# Patient Record
Sex: MALE | Race: WHITE | NOT HISPANIC OR LATINO | Employment: PART TIME | URBAN - METROPOLITAN AREA
[De-identification: names, ages, dates, MRNs, and addresses within clinical notes are randomized per-mention and may not be internally consistent; named-entity substitution may affect disease eponyms.]

---

## 2018-07-13 ENCOUNTER — RECORDS - HEALTHEAST (OUTPATIENT)
Dept: LAB | Facility: CLINIC | Age: 58
End: 2018-07-13

## 2018-07-13 LAB
ANION GAP SERPL CALCULATED.3IONS-SCNC: 10 MMOL/L (ref 5–18)
BNP SERPL-MCNC: 33 PG/ML (ref 0–49)
BUN SERPL-MCNC: 13 MG/DL (ref 8–22)
CALCIUM SERPL-MCNC: 9.8 MG/DL (ref 8.5–10.5)
CHLORIDE BLD-SCNC: 108 MMOL/L (ref 98–107)
CO2 SERPL-SCNC: 23 MMOL/L (ref 22–31)
CREAT SERPL-MCNC: 0.93 MG/DL (ref 0.7–1.3)
GFR SERPL CREATININE-BSD FRML MDRD: >60 ML/MIN/1.73M2
GLUCOSE BLD-MCNC: 96 MG/DL (ref 70–125)
POTASSIUM BLD-SCNC: 4.2 MMOL/L (ref 3.5–5)
SODIUM SERPL-SCNC: 141 MMOL/L (ref 136–145)

## 2018-07-27 ENCOUNTER — RECORDS - HEALTHEAST (OUTPATIENT)
Dept: LAB | Facility: CLINIC | Age: 58
End: 2018-07-27

## 2018-07-27 LAB
ANION GAP SERPL CALCULATED.3IONS-SCNC: 11 MMOL/L (ref 5–18)
BUN SERPL-MCNC: 17 MG/DL (ref 8–22)
CALCIUM SERPL-MCNC: 9.3 MG/DL (ref 8.5–10.5)
CHLORIDE BLD-SCNC: 105 MMOL/L (ref 98–107)
CO2 SERPL-SCNC: 23 MMOL/L (ref 22–31)
CREAT SERPL-MCNC: 0.95 MG/DL (ref 0.7–1.3)
GFR SERPL CREATININE-BSD FRML MDRD: >60 ML/MIN/1.73M2
GLUCOSE BLD-MCNC: 102 MG/DL (ref 70–125)
POTASSIUM BLD-SCNC: 4.9 MMOL/L (ref 3.5–5)
SODIUM SERPL-SCNC: 139 MMOL/L (ref 136–145)

## 2018-08-10 ENCOUNTER — RECORDS - HEALTHEAST (OUTPATIENT)
Dept: LAB | Facility: CLINIC | Age: 58
End: 2018-08-10

## 2018-08-10 LAB
ALBUMIN SERPL-MCNC: 3.7 G/DL (ref 3.5–5)
ALP SERPL-CCNC: 91 U/L (ref 45–120)
ALT SERPL W P-5'-P-CCNC: 31 U/L (ref 0–45)
ANION GAP SERPL CALCULATED.3IONS-SCNC: 13 MMOL/L (ref 5–18)
AST SERPL W P-5'-P-CCNC: 22 U/L (ref 0–40)
BILIRUB SERPL-MCNC: 0.5 MG/DL (ref 0–1)
BUN SERPL-MCNC: 16 MG/DL (ref 8–22)
CALCIUM SERPL-MCNC: 9.4 MG/DL (ref 8.5–10.5)
CHLORIDE BLD-SCNC: 105 MMOL/L (ref 98–107)
CO2 SERPL-SCNC: 23 MMOL/L (ref 22–31)
CREAT SERPL-MCNC: 1.16 MG/DL (ref 0.7–1.3)
GFR SERPL CREATININE-BSD FRML MDRD: >60 ML/MIN/1.73M2
GLUCOSE BLD-MCNC: 99 MG/DL (ref 70–125)
POTASSIUM BLD-SCNC: 4.7 MMOL/L (ref 3.5–5)
PROT SERPL-MCNC: 7.4 G/DL (ref 6–8)
SODIUM SERPL-SCNC: 141 MMOL/L (ref 136–145)
T4 FREE SERPL-MCNC: 0.9 NG/DL (ref 0.7–1.8)
TSH SERPL DL<=0.005 MIU/L-ACNC: 6.41 UIU/ML (ref 0.3–5)

## 2020-01-23 ENCOUNTER — RECORDS - HEALTHEAST (OUTPATIENT)
Dept: LAB | Facility: CLINIC | Age: 60
End: 2020-01-23

## 2020-01-23 LAB
ANION GAP SERPL CALCULATED.3IONS-SCNC: 11 MMOL/L (ref 5–18)
BUN SERPL-MCNC: 14 MG/DL (ref 8–22)
CALCIUM SERPL-MCNC: 9.5 MG/DL (ref 8.5–10.5)
CHLORIDE BLD-SCNC: 102 MMOL/L (ref 98–107)
CHOLEST SERPL-MCNC: 178 MG/DL
CO2 SERPL-SCNC: 26 MMOL/L (ref 22–31)
CREAT SERPL-MCNC: 0.92 MG/DL (ref 0.7–1.3)
FASTING STATUS PATIENT QL REPORTED: ABNORMAL
GFR SERPL CREATININE-BSD FRML MDRD: >60 ML/MIN/1.73M2
GLUCOSE BLD-MCNC: 89 MG/DL (ref 70–125)
HDLC SERPL-MCNC: 38 MG/DL
LDLC SERPL CALC-MCNC: 120 MG/DL
POTASSIUM BLD-SCNC: 4 MMOL/L (ref 3.5–5)
SODIUM SERPL-SCNC: 139 MMOL/L (ref 136–145)
TRIGL SERPL-MCNC: 100 MG/DL

## 2020-09-03 ENCOUNTER — RECORDS - HEALTHEAST (OUTPATIENT)
Dept: LAB | Facility: CLINIC | Age: 60
End: 2020-09-03

## 2020-09-03 LAB
CHOLEST SERPL-MCNC: 121 MG/DL
FASTING STATUS PATIENT QL REPORTED: ABNORMAL
HDLC SERPL-MCNC: 28 MG/DL
LDLC SERPL CALC-MCNC: 69 MG/DL
TRIGL SERPL-MCNC: 121 MG/DL

## 2020-11-04 ENCOUNTER — RECORDS - HEALTHEAST (OUTPATIENT)
Dept: LAB | Facility: CLINIC | Age: 60
End: 2020-11-04

## 2020-11-04 LAB — TSH SERPL DL<=0.005 MIU/L-ACNC: 3.36 UIU/ML (ref 0.3–5)

## 2021-04-23 ENCOUNTER — RECORDS - HEALTHEAST (OUTPATIENT)
Dept: LAB | Facility: CLINIC | Age: 61
End: 2021-04-23

## 2021-04-23 LAB
ALBUMIN SERPL-MCNC: 3.5 G/DL (ref 3.5–5)
ALP SERPL-CCNC: 106 U/L (ref 45–120)
ALT SERPL W P-5'-P-CCNC: 23 U/L (ref 0–45)
ANION GAP SERPL CALCULATED.3IONS-SCNC: 9 MMOL/L (ref 5–18)
AST SERPL W P-5'-P-CCNC: 15 U/L (ref 0–40)
BILIRUB SERPL-MCNC: 0.5 MG/DL (ref 0–1)
BNP SERPL-MCNC: 21 PG/ML (ref 0–53)
BUN SERPL-MCNC: 16 MG/DL (ref 8–22)
CALCIUM SERPL-MCNC: 9.2 MG/DL (ref 8.5–10.5)
CHLORIDE BLD-SCNC: 104 MMOL/L (ref 98–107)
CO2 SERPL-SCNC: 24 MMOL/L (ref 22–31)
CREAT SERPL-MCNC: 0.99 MG/DL (ref 0.7–1.3)
GFR SERPL CREATININE-BSD FRML MDRD: >60 ML/MIN/1.73M2
GLUCOSE BLD-MCNC: 104 MG/DL (ref 70–125)
POTASSIUM BLD-SCNC: 4.2 MMOL/L (ref 3.5–5)
PROT SERPL-MCNC: 7.1 G/DL (ref 6–8)
SODIUM SERPL-SCNC: 137 MMOL/L (ref 136–145)

## 2021-06-04 ENCOUNTER — THERAPY VISIT (OUTPATIENT)
Dept: PHYSICAL THERAPY | Facility: CLINIC | Age: 61
End: 2021-06-04
Payer: COMMERCIAL

## 2021-06-04 DIAGNOSIS — M25.551 BILATERAL HIP PAIN: ICD-10-CM

## 2021-06-04 DIAGNOSIS — M25.552 BILATERAL HIP PAIN: ICD-10-CM

## 2021-06-04 PROCEDURE — 97161 PT EVAL LOW COMPLEX 20 MIN: CPT | Mod: GP | Performed by: PHYSICAL THERAPIST

## 2021-06-04 PROCEDURE — 97110 THERAPEUTIC EXERCISES: CPT | Mod: GP | Performed by: PHYSICAL THERAPIST

## 2021-06-04 PROCEDURE — 97530 THERAPEUTIC ACTIVITIES: CPT | Mod: GP | Performed by: PHYSICAL THERAPIST

## 2021-06-04 ASSESSMENT — ACTIVITIES OF DAILY LIVING (ADL)
WALKING_UP_STEEP_HILLS: EXTREME DIFFICULTY
HOS_ADL_COUNT: 17
GETTING_INTO_AND_OUT_OF_A_BATHTUB: SLIGHT DIFFICULTY
STANDING_FOR_15_MINUTES: MODERATE DIFFICULTY
WALKING_INITIALLY: SLIGHT DIFFICULTY
HOS_ADL_ITEM_SCORE_TOTAL: 29
GOING_UP_1_FLIGHT_OF_STAIRS: SLIGHT DIFFICULTY
WALKING_DOWN_STEEP_HILLS: EXTREME DIFFICULTY
GETTING_INTO_AND_OUT_OF_AN_AVERAGE_CAR: EXTREME DIFFICULTY
WALKING_15_MINUTES_OR_GREATER: EXTREME DIFFICULTY
HOS_ADL_SCORE(%): 42.65
SITTING_FOR_15_MINUTES: SLIGHT DIFFICULTY
ROLLING_OVER_IN_BED: EXTREME DIFFICULTY
PUTTING_ON_SOCKS_AND_SHOES: SLIGHT DIFFICULTY
LIGHT_TO_MODERATE_WORK: MODERATE DIFFICULTY
HEAVY_WORK: EXTREME DIFFICULTY
GOING_DOWN_1_FLIGHT_OF_STAIRS: SLIGHT DIFFICULTY
TWISTING/PIVOTING_ON_INVOLVED_LEG: SLIGHT DIFFICULTY
STEPPING_UP_AND_DOWN_CURBS: EXTREME DIFFICULTY
WALKING_APPROXIMATELY_10_MINUTES: EXTREME DIFFICULTY
RECREATIONAL_ACTIVITIES: EXTREME DIFFICULTY
HOS_ADL_HIGHEST_POTENTIAL_SCORE: 68
DEEP_SQUATTING: EXTREME DIFFICULTY

## 2021-06-04 NOTE — LETTER
[facility]  [address] [place] [address]  [phone]    2021    Re: Jann Harkins   :   1960  MRN:  [id number]   REFERRING PHYSICIAN:   Sabino TEIXEIRA [facility]  Date of Initial Evaluation:  21  Visits:  Rxs Used: 1  Reason for Referral:  Bilateral hip pain    EVALUATION SUMMARY    Physical Therapy Initial Evaluation  Subjective:  The history is provided by the patient. No  was used.     Therapist Generated HPI Evaluation  Problem details: Oct. 2020. Pt started having B hip (L>R) and B leg pain after slipping and fall while pushing carts outside at work in Oct. 2020. He recalls slipping on a patch of black ice at time of injury. Denies history of hip pain..         Type of problem:  Bilateral hips.  This is a chronic condition.  Condition occurred with:  A fall/slip.  Where condition occurred: at work.  Patient reports pain:  Greater trochanter and lateral.  Pain is described as burning and sharp and is intermittent.  Pain radiates to:  Thigh, knee and lower leg. Pain is worse in the P.M..  Since onset symptoms are gradually worsening.  Symptoms are exacerbated by walking, ascending stairs and other (walking up hills)  and relieved by ice and analgesics.  Special tests included:  X-ray (B hip OA).  Restrictions due to condition include:  Currently not working due to present treatment.  Barriers include:  Stairs (Lives on 3rd floor apartment building with no elevator).    Patient Health History  Jann Harkins being seen for hip and leg.   Pain is reported as 5/10 on pain scale.  General health as reported by patient is fair (d/t pain and overall health).  Pertinent medical history includes: heart problems, high blood pressure, osteoarthritis and overweight.   Red flags:  None as reported by patient.  Medical allergies: none.   Surgeries include:  None.     Current medications:  High blood pressure medication.    Current occupation is currently not working.        Re: Jann Harkins   :   1960    Objective:  Gait:    Gait Type:  Antalgic   Assistive Devices:  None  Deviations:  Shoulder:  Decr arm swing RLumbar:  Trunk lean LAnkle:  Push off decr LGeneral Deviations:  Stride length decr and base of support incr    Hip Evaluation  Hip PROM:    Flexion: Left: 50 +   Right: WFL -  Internal Rotation: Left: lacking 10 +    Right: 15 -  External Rotation: Left: 30 +    Right: WFL -  Hip Special Testing:    Left hip positive for the following special tests:  Jimmy   Right hip positive for the following special tests:  Jimmy      Assessment/Plan:    Patient is a 61 year old male with both sides hip complaints.    Patient has the following significant findings with corresponding treatment plan.                Diagnosis 1:  B hip pain with degenerative OA  Pain -  hot/cold therapy, manual therapy, self management, education and home program  Decreased ROM/flexibility - manual therapy, therapeutic exercise and home program  Decreased strength - therapeutic exercise, therapeutic activities and home program  Decreased proprioception - neuro re-education, gait training, therapeutic activities and home program  Impaired gait - gait training, assistive devices and home program  Impaired muscle performance - neuro re-education and home program  Decreased function - therapeutic activities and home program  Impaired posture - neuro re-education and home program    Therapy Evaluation Codes:   1) History comprised of:   Personal factors that impact the plan of care:      Time since onset of symptoms.    Comorbidity factors that impact the plan of care are:      Heart problems, High blood pressure, Osteoarthritis and Overweight.     Medications impacting care: High blood pressure.  2) Examination of Body Systems comprised of:   Body structures and functions that impact the plan  of care:      Hip.   Activity limitations that impact the plan of care are:      Stairs and Walking.  3) Clinical presentation characteristics are:   Stable/Uncomplicated.  4) Decision-Making    Low complexity using standardized patient assessment instrument and/or measureable assessment of functional outcome.  Cumulative Therapy Evaluation is: Low complexity.    Previous and current functional limitations:  (See Goal Flow Sheet for this information)    Short term and Long term goals: (See Goal Flow Sheet for this information)     Re: Jann Harkins   :   1960    Communication ability:  Patient appears to be able to clearly communicate and understand verbal and written communication and follow directions correctly.  Treatment Explanation - The following has been discussed with the patient:   RX ordered/plan of care  Anticipated outcomes  Possible risks and side effects  This patient would benefit from PT intervention to resume normal activities.   Rehab potential is good.    Frequency:  1 X week, once daily  Duration:  for 12 weeks  Discharge Plan:  Achieve all LTG.  Independent in home treatment program.  Reach maximal therapeutic benefit.    Thank you for your referral.    INQUIRIES  Therapist: Mundo Clark, DPT, ATC, Cert. MDT   [facility] [address] [place] [address]  Phone: [phone]  Fax: [phone]

## 2021-06-04 NOTE — PROGRESS NOTES
Physical Therapy Initial Evaluation  Subjective:  The history is provided by the patient. No  was used.   Therapist Generated HPI Evaluation  Problem details: Oct. 2020. Pt started having B hip (L>R) and B leg pain after slipping and fall while pushing carts outside at work in Oct. 2020. He recalls slipping on a patch of black ice at time of injury. Denies history of hip pain..         Type of problem:  Bilateral hips.    This is a chronic condition.  Condition occurred with:  A fall/slip.  Where condition occurred: at work.  Patient reports pain:  Greater trochanter and lateral.  Pain is described as burning and sharp and is intermittent.  Pain radiates to:  Thigh, knee and lower leg. Pain is worse in the P.M..  Since onset symptoms are gradually worsening.  Symptoms are exacerbated by walking, ascending stairs and other (walking up hills)  and relieved by ice and analgesics.  Special tests included:  X-ray (B hip OA).    Restrictions due to condition include:  Currently not working due to present treatment.  Barriers include:  Stairs (Lives on 3rd floor apartment building with no elevator).    Patient Health History  Jann MONTEIRO Torin being seen for hip and leg.          Pain is reported as 5/10 on pain scale.  General health as reported by patient is fair (d/t pain and overall health).  Pertinent medical history includes: heart problems, high blood pressure, osteoarthritis and overweight.   Red flags:  None as reported by patient.  Medical allergies: none.   Surgeries include:  None.    Current medications:  High blood pressure medication.    Current occupation is currently not working.                                       Objective:    Gait:    Gait Type:  Antalgic   Assistive Devices:  None  Deviations:  Shoulder:  Decr arm swing RLumbar:  Trunk lean LAnkle:  Push off decr LGeneral Deviations:  Stride length decr and base of support incr                                                 Hip  Evaluation  Hip PROM:    Flexion: Left: 50 +   Right: WFL -        Internal Rotation: Left: lacking 10 +    Right: 15 -  External Rotation: Left: 30 +    Right: WFL -                Hip Special Testing:    Left hip positive for the following special tests:  Jimmy   Right hip positive for the following special tests:  Jimmy                 General     ROS    Assessment/Plan:    Patient is a 61 year old male with both sides hip complaints.    Patient has the following significant findings with corresponding treatment plan.                Diagnosis 1:  B hip pain with degenerative OA  Pain -  hot/cold therapy, manual therapy, self management, education and home program  Decreased ROM/flexibility - manual therapy, therapeutic exercise and home program  Decreased strength - therapeutic exercise, therapeutic activities and home program  Decreased proprioception - neuro re-education, gait training, therapeutic activities and home program  Impaired gait - gait training, assistive devices and home program  Impaired muscle performance - neuro re-education and home program  Decreased function - therapeutic activities and home program  Impaired posture - neuro re-education and home program    Therapy Evaluation Codes:   1) History comprised of:   Personal factors that impact the plan of care:      Time since onset of symptoms.    Comorbidity factors that impact the plan of care are:      Heart problems, High blood pressure, Osteoarthritis and Overweight.     Medications impacting care: High blood pressure.  2) Examination of Body Systems comprised of:   Body structures and functions that impact the plan of care:      Hip.   Activity limitations that impact the plan of care are:      Stairs and Walking.  3) Clinical presentation characteristics are:   Stable/Uncomplicated.  4) Decision-Making    Low complexity using standardized patient assessment instrument and/or measureable assessment of functional outcome.  Cumulative  Therapy Evaluation is: Low complexity.    Previous and current functional limitations:  (See Goal Flow Sheet for this information)    Short term and Long term goals: (See Goal Flow Sheet for this information)     Communication ability:  Patient appears to be able to clearly communicate and understand verbal and written communication and follow directions correctly.  Treatment Explanation - The following has been discussed with the patient:   RX ordered/plan of care  Anticipated outcomes  Possible risks and side effects  This patient would benefit from PT intervention to resume normal activities.   Rehab potential is good.    Frequency:  1 X week, once daily  Duration:  for 12 weeks  Discharge Plan:  Achieve all LTG.  Independent in home treatment program.  Reach maximal therapeutic benefit.    Please refer to the daily flowsheet for treatment today, total treatment time and time spent performing 1:1 timed codes.

## 2021-06-08 PROBLEM — M25.552 BILATERAL HIP PAIN: Status: ACTIVE | Noted: 2021-06-08

## 2021-06-08 PROBLEM — M25.551 BILATERAL HIP PAIN: Status: ACTIVE | Noted: 2021-06-08

## 2021-06-11 ENCOUNTER — THERAPY VISIT (OUTPATIENT)
Dept: PHYSICAL THERAPY | Facility: CLINIC | Age: 61
End: 2021-06-11
Payer: COMMERCIAL

## 2021-06-11 DIAGNOSIS — M25.551 BILATERAL HIP PAIN: ICD-10-CM

## 2021-06-11 DIAGNOSIS — M25.552 BILATERAL HIP PAIN: ICD-10-CM

## 2021-06-11 PROCEDURE — 97110 THERAPEUTIC EXERCISES: CPT | Mod: GP | Performed by: PHYSICAL THERAPIST

## 2021-06-11 PROCEDURE — 97112 NEUROMUSCULAR REEDUCATION: CPT | Mod: GP | Performed by: PHYSICAL THERAPIST

## 2021-06-25 ENCOUNTER — THERAPY VISIT (OUTPATIENT)
Dept: PHYSICAL THERAPY | Facility: CLINIC | Age: 61
End: 2021-06-25
Payer: COMMERCIAL

## 2021-06-25 DIAGNOSIS — M25.551 BILATERAL HIP PAIN: ICD-10-CM

## 2021-06-25 DIAGNOSIS — M25.552 BILATERAL HIP PAIN: ICD-10-CM

## 2021-06-25 PROCEDURE — 97110 THERAPEUTIC EXERCISES: CPT | Mod: GP | Performed by: PHYSICAL THERAPIST

## 2021-06-25 PROCEDURE — 97112 NEUROMUSCULAR REEDUCATION: CPT | Mod: GP | Performed by: PHYSICAL THERAPIST

## 2021-07-09 ENCOUNTER — THERAPY VISIT (OUTPATIENT)
Dept: PHYSICAL THERAPY | Facility: CLINIC | Age: 61
End: 2021-07-09
Payer: COMMERCIAL

## 2021-07-09 DIAGNOSIS — M25.552 BILATERAL HIP PAIN: ICD-10-CM

## 2021-07-09 DIAGNOSIS — M25.551 BILATERAL HIP PAIN: ICD-10-CM

## 2021-07-09 PROCEDURE — 97110 THERAPEUTIC EXERCISES: CPT | Mod: GP | Performed by: PHYSICAL THERAPIST

## 2021-07-09 PROCEDURE — 97112 NEUROMUSCULAR REEDUCATION: CPT | Mod: GP | Performed by: PHYSICAL THERAPIST

## 2021-07-19 ENCOUNTER — THERAPY VISIT (OUTPATIENT)
Dept: PHYSICAL THERAPY | Facility: CLINIC | Age: 61
End: 2021-07-19
Payer: COMMERCIAL

## 2021-07-19 DIAGNOSIS — M25.552 BILATERAL HIP PAIN: ICD-10-CM

## 2021-07-19 DIAGNOSIS — M25.551 BILATERAL HIP PAIN: ICD-10-CM

## 2021-07-19 PROCEDURE — 97110 THERAPEUTIC EXERCISES: CPT | Mod: GP | Performed by: PHYSICAL THERAPIST

## 2021-07-19 PROCEDURE — 97112 NEUROMUSCULAR REEDUCATION: CPT | Mod: GP | Performed by: PHYSICAL THERAPIST

## 2021-08-18 ENCOUNTER — LAB REQUISITION (OUTPATIENT)
Dept: LAB | Facility: CLINIC | Age: 61
End: 2021-08-18
Payer: COMMERCIAL

## 2021-08-18 DIAGNOSIS — I89.0 LYMPHEDEMA, NOT ELSEWHERE CLASSIFIED: ICD-10-CM

## 2021-08-18 LAB
ANION GAP SERPL CALCULATED.3IONS-SCNC: 11 MMOL/L (ref 5–18)
BUN SERPL-MCNC: 15 MG/DL (ref 8–22)
CALCIUM SERPL-MCNC: 9.9 MG/DL (ref 8.5–10.5)
CHLORIDE BLD-SCNC: 106 MMOL/L (ref 98–107)
CO2 SERPL-SCNC: 22 MMOL/L (ref 22–31)
CREAT SERPL-MCNC: 0.92 MG/DL (ref 0.7–1.3)
GFR SERPL CREATININE-BSD FRML MDRD: 89 ML/MIN/1.73M2
GLUCOSE BLD-MCNC: 113 MG/DL (ref 70–125)
POTASSIUM BLD-SCNC: 4.5 MMOL/L (ref 3.5–5)
SODIUM SERPL-SCNC: 139 MMOL/L (ref 136–145)

## 2021-08-18 PROCEDURE — 80048 BASIC METABOLIC PNL TOTAL CA: CPT | Performed by: FAMILY MEDICINE

## 2021-10-08 DIAGNOSIS — Z11.59 ENCOUNTER FOR SCREENING FOR OTHER VIRAL DISEASES: ICD-10-CM

## 2021-11-04 NOTE — OR NURSING
Pt has been called multiple times, with messages being left with each attempt. Pt still has not called back. Writer reached out and spoke with Sean at [facility] that provided an additional number for Nicolle listed as his emergency contact. Her number is [phone]. A message was left for her to call us or contact Jann to contact us ASAP to get pre op info.     11/4 @ 4144 Nicolle (Irineo's girlfriend) and Irineo called on a conference call. Both parties denied knowing about the need for a pre op and covid test. They were both told that they are both hard stops and the procedure will be cx if not done. They stated understanding of arrival time, date, NPO ([facility] bowel prep regimen reviewed) and the need for a  were all addressed/reviewed with both of them and they stated complete understanding. [facility] number was given in case they cannot get in for a pre op to reschedule.

## 2021-11-05 ENCOUNTER — LAB REQUISITION (OUTPATIENT)
Dept: LAB | Facility: CLINIC | Age: 61
End: 2021-11-05
Payer: COMMERCIAL

## 2021-11-05 DIAGNOSIS — I25.119 ATHEROSCLEROTIC HEART DISEASE OF NATIVE CORONARY ARTERY WITH UNSPECIFIED ANGINA PECTORIS (H): ICD-10-CM

## 2021-11-05 LAB
ANION GAP SERPL CALCULATED.3IONS-SCNC: 12 MMOL/L (ref 5–18)
BUN SERPL-MCNC: 9 MG/DL (ref 8–22)
CALCIUM SERPL-MCNC: 9.9 MG/DL (ref 8.5–10.5)
CHLORIDE BLD-SCNC: 104 MMOL/L (ref 98–107)
CO2 SERPL-SCNC: 24 MMOL/L (ref 22–31)
CREAT SERPL-MCNC: 0.95 MG/DL (ref 0.7–1.3)
GFR SERPL CREATININE-BSD FRML MDRD: 86 ML/MIN/1.73M2
GLUCOSE BLD-MCNC: 105 MG/DL (ref 70–125)
POTASSIUM BLD-SCNC: 4.1 MMOL/L (ref 3.5–5)
SODIUM SERPL-SCNC: 140 MMOL/L (ref 136–145)

## 2021-11-05 PROCEDURE — 80048 BASIC METABOLIC PNL TOTAL CA: CPT | Mod: ORL | Performed by: PHYSICIAN ASSISTANT

## 2021-11-08 ENCOUNTER — ANESTHESIA (OUTPATIENT)
Dept: SURGERY | Facility: CLINIC | Age: 61
End: 2021-11-08
Payer: COMMERCIAL

## 2021-11-08 ENCOUNTER — ANESTHESIA EVENT (OUTPATIENT)
Dept: SURGERY | Facility: CLINIC | Age: 61
End: 2021-11-08
Payer: COMMERCIAL

## 2021-11-08 ENCOUNTER — HOSPITAL ENCOUNTER (OUTPATIENT)
Facility: CLINIC | Age: 61
Discharge: HOME | End: 2021-11-08
Attending: INTERNAL MEDICINE | Admitting: INTERNAL MEDICINE
Payer: COMMERCIAL

## 2021-11-08 VITALS
BODY MASS INDEX: 53.37 KG/M2 | HEART RATE: 73 BPM | WEIGHT: 290 LBS | HEIGHT: 62 IN | OXYGEN SATURATION: 98 % | DIASTOLIC BLOOD PRESSURE: 75 MMHG | SYSTOLIC BLOOD PRESSURE: 155 MMHG | TEMPERATURE: 97.3 F | RESPIRATION RATE: 16 BRPM

## 2021-11-08 LAB — COLONOSCOPY: NORMAL

## 2021-11-08 PROCEDURE — 88305 TISSUE EXAM BY PATHOLOGIST: CPT | Mod: TC | Performed by: INTERNAL MEDICINE

## 2021-11-08 PROCEDURE — 999N000141 HC STATISTIC PRE-PROCEDURE NURSING ASSESSMENT: Performed by: INTERNAL MEDICINE

## 2021-11-08 PROCEDURE — 360N000075 HC SURGERY LEVEL 2, PER MIN: Performed by: INTERNAL MEDICINE

## 2021-11-08 PROCEDURE — 258N000003 HC RX IP 258 OP 636: Performed by: NURSE ANESTHETIST, CERTIFIED REGISTERED

## 2021-11-08 PROCEDURE — 710N000012 HC RECOVERY PHASE 2, PER MINUTE: Performed by: INTERNAL MEDICINE

## 2021-11-08 PROCEDURE — 370N000017 HC ANESTHESIA TECHNICAL FEE, PER MIN: Performed by: INTERNAL MEDICINE

## 2021-11-08 PROCEDURE — 272N000001 HC OR GENERAL SUPPLY STERILE: Performed by: INTERNAL MEDICINE

## 2021-11-08 PROCEDURE — 250N000011 HC RX IP 250 OP 636: Performed by: NURSE ANESTHETIST, CERTIFIED REGISTERED

## 2021-11-08 PROCEDURE — 258N000003 HC RX IP 258 OP 636: Performed by: ANESTHESIOLOGY

## 2021-11-08 RX ORDER — ATORVASTATIN CALCIUM 40 MG/1
40 TABLET, FILM COATED ORAL DAILY
COMMUNITY
End: 2023-05-31

## 2021-11-08 RX ORDER — SODIUM CHLORIDE, SODIUM LACTATE, POTASSIUM CHLORIDE, CALCIUM CHLORIDE 600; 310; 30; 20 MG/100ML; MG/100ML; MG/100ML; MG/100ML
INJECTION, SOLUTION INTRAVENOUS CONTINUOUS
Status: DISCONTINUED | OUTPATIENT
Start: 2021-11-08 | End: 2021-11-08 | Stop reason: HOSPADM

## 2021-11-08 RX ORDER — HYDROMORPHONE HCL IN WATER/PF 6 MG/30 ML
0.2 PATIENT CONTROLLED ANALGESIA SYRINGE INTRAVENOUS EVERY 5 MIN PRN
Status: DISCONTINUED | OUTPATIENT
Start: 2021-11-08 | End: 2021-11-08 | Stop reason: HOSPADM

## 2021-11-08 RX ORDER — PROPOFOL 10 MG/ML
INJECTION, EMULSION INTRAVENOUS CONTINUOUS PRN
Status: DISCONTINUED | OUTPATIENT
Start: 2021-11-08 | End: 2021-11-08

## 2021-11-08 RX ORDER — SODIUM CHLORIDE, SODIUM LACTATE, POTASSIUM CHLORIDE, CALCIUM CHLORIDE 600; 310; 30; 20 MG/100ML; MG/100ML; MG/100ML; MG/100ML
INJECTION, SOLUTION INTRAVENOUS CONTINUOUS PRN
Status: DISCONTINUED | OUTPATIENT
Start: 2021-11-08 | End: 2021-11-08

## 2021-11-08 RX ORDER — DEXAMETHASONE SODIUM PHOSPHATE 4 MG/ML
INJECTION, SOLUTION INTRA-ARTICULAR; INTRALESIONAL; INTRAMUSCULAR; INTRAVENOUS; SOFT TISSUE PRN
Status: DISCONTINUED | OUTPATIENT
Start: 2021-11-08 | End: 2021-11-08

## 2021-11-08 RX ORDER — NITROGLYCERIN 0.4 MG/1
0.4 TABLET SUBLINGUAL EVERY 5 MIN PRN
COMMUNITY

## 2021-11-08 RX ORDER — MEPERIDINE HYDROCHLORIDE 25 MG/ML
12.5 INJECTION INTRAMUSCULAR; INTRAVENOUS; SUBCUTANEOUS
Status: DISCONTINUED | OUTPATIENT
Start: 2021-11-08 | End: 2021-11-08 | Stop reason: HOSPADM

## 2021-11-08 RX ORDER — FENTANYL CITRATE 50 UG/ML
25 INJECTION, SOLUTION INTRAMUSCULAR; INTRAVENOUS EVERY 5 MIN PRN
Status: DISCONTINUED | OUTPATIENT
Start: 2021-11-08 | End: 2021-11-08 | Stop reason: HOSPADM

## 2021-11-08 RX ORDER — SPIRONOLACTONE 25 MG/1
25 TABLET ORAL DAILY
COMMUNITY
End: 2023-05-31

## 2021-11-08 RX ORDER — ONDANSETRON 2 MG/ML
INJECTION INTRAMUSCULAR; INTRAVENOUS PRN
Status: DISCONTINUED | OUTPATIENT
Start: 2021-11-08 | End: 2021-11-08

## 2021-11-08 RX ORDER — ASPIRIN 81 MG/1
81 TABLET, CHEWABLE ORAL DAILY
COMMUNITY

## 2021-11-08 RX ORDER — LIDOCAINE 40 MG/G
CREAM TOPICAL
Status: DISCONTINUED | OUTPATIENT
Start: 2021-11-08 | End: 2021-11-08 | Stop reason: HOSPADM

## 2021-11-08 RX ORDER — PROPOFOL 10 MG/ML
INJECTION, EMULSION INTRAVENOUS PRN
Status: DISCONTINUED | OUTPATIENT
Start: 2021-11-08 | End: 2021-11-08

## 2021-11-08 RX ORDER — ONDANSETRON 4 MG/1
4 TABLET, ORALLY DISINTEGRATING ORAL EVERY 30 MIN PRN
Status: DISCONTINUED | OUTPATIENT
Start: 2021-11-08 | End: 2021-11-08 | Stop reason: HOSPADM

## 2021-11-08 RX ORDER — FENTANYL CITRATE 50 UG/ML
25 INJECTION, SOLUTION INTRAMUSCULAR; INTRAVENOUS
Status: DISCONTINUED | OUTPATIENT
Start: 2021-11-08 | End: 2021-11-08 | Stop reason: HOSPADM

## 2021-11-08 RX ORDER — ONDANSETRON 2 MG/ML
4 INJECTION INTRAMUSCULAR; INTRAVENOUS EVERY 30 MIN PRN
Status: DISCONTINUED | OUTPATIENT
Start: 2021-11-08 | End: 2021-11-08 | Stop reason: HOSPADM

## 2021-11-08 RX ORDER — OXYCODONE HYDROCHLORIDE 5 MG/1
5 TABLET ORAL EVERY 4 HOURS PRN
Status: DISCONTINUED | OUTPATIENT
Start: 2021-11-08 | End: 2021-11-08 | Stop reason: HOSPADM

## 2021-11-08 RX ORDER — FUROSEMIDE 20 MG
20 TABLET ORAL DAILY
COMMUNITY
End: 2023-05-31

## 2021-11-08 RX ORDER — LISINOPRIL 5 MG/1
5 TABLET ORAL DAILY
COMMUNITY

## 2021-11-08 RX ADMIN — DEXAMETHASONE SODIUM PHOSPHATE 4 MG: 4 INJECTION, SOLUTION INTRA-ARTICULAR; INTRALESIONAL; INTRAMUSCULAR; INTRAVENOUS; SOFT TISSUE at 12:03

## 2021-11-08 RX ADMIN — SODIUM CHLORIDE, POTASSIUM CHLORIDE, SODIUM LACTATE AND CALCIUM CHLORIDE: 600; 310; 30; 20 INJECTION, SOLUTION INTRAVENOUS at 10:52

## 2021-11-08 RX ADMIN — PROPOFOL 10 MG: 10 INJECTION, EMULSION INTRAVENOUS at 11:56

## 2021-11-08 RX ADMIN — ONDANSETRON 4 MG: 2 INJECTION INTRAMUSCULAR; INTRAVENOUS at 12:03

## 2021-11-08 RX ADMIN — SODIUM CHLORIDE, POTASSIUM CHLORIDE, SODIUM LACTATE AND CALCIUM CHLORIDE: 600; 310; 30; 20 INJECTION, SOLUTION INTRAVENOUS at 11:42

## 2021-11-08 RX ADMIN — PROPOFOL 200 MCG/KG/MIN: 10 INJECTION, EMULSION INTRAVENOUS at 11:55

## 2021-11-08 ASSESSMENT — MIFFLIN-ST. JEOR: SCORE: 1999.68

## 2021-11-08 NOTE — ANESTHESIA PREPROCEDURE EVALUATION
Anesthesia Pre-Procedure Evaluation    Patient: Jann Harkins   MRN: [id number] : 1960        Preoperative Diagnosis: Screening for colon cancer [Z12.11]    Procedure : Procedure(s):  COLONOSCOPY          History reviewed. No pertinent past medical history.   History reviewed. No pertinent surgical history.   No Known Allergies   Social History     Tobacco Use     Smoking status: Not on file     Smokeless tobacco: Not on file   Substance Use Topics     Alcohol use: Not on file      Wt Readings from Last 1 Encounters:   No data found for Wt        Anesthesia Evaluation            ROS/MED HX  ENT/Pulmonary:     (+) BRANDAN risk factors, snores loudly, hypertension, obese,     Neurologic:  - neg neurologic ROS     Cardiovascular: Comment: Stress test 1 week prior per patient that was negative    (+) Dyslipidemia hypertension--CAD angina-with excertion. --CHF Last EF: preserved     METS/Exercise Tolerance:     Hematologic:  - neg hematologic  ROS     Musculoskeletal:   (+) arthritis,     GI/Hepatic:       Renal/Genitourinary:       Endo:     (+) thyroid problem,     Psychiatric/Substance Use:  - neg psychiatric ROS     Infectious Disease:  - neg infectious disease ROS     Malignancy:  - neg malignancy ROS     Other:            Physical Exam    Airway        Mallampati: II   TM distance: > 3 FB      Respiratory Devices and Support         Dental     Comment: muliple missing teeth, denies loose        Cardiovascular          Rhythm and rate: regular and normal     Pulmonary   pulmonary exam normal        breath sounds clear to auscultation           OUTSIDE LABS:  CBC: No results found for: WBC, HGB, HCT, PLT  BMP:   Lab Results   Component Value Date     2021     2021    POTASSIUM 4.1 2021    POTASSIUM 4.5 2021    CHLORIDE 104 2021    CHLORIDE 106 2021    CO2 24 2021    CO2 22 2021    BUN 9 2021    BUN 15 2021    CR 0.95 2021    CR 0.92  08/18/2021     11/05/2021     08/18/2021     COAGS: No results found for: PTT, INR, FIBR  POC: No results found for: BGM, HCG, HCGS  HEPATIC: No results found for: ALBUMIN, PROTTOTAL, ALT, AST, GGT, ALKPHOS, BILITOTAL, BILIDIRECT, ABY  OTHER:   Lab Results   Component Value Date    LUPILLO 9.9 11/05/2021       Anesthesia Plan    ASA Status:  4      Anesthesia Type: MAC.              Consents    Anesthesia Plan(s) and associated risks, benefits, and realistic alternatives discussed. Questions answered and patient/representative(s) expressed understanding.     - Discussed with:  Patient         Postoperative Care            Comments:                Miriam Villanueva MD

## 2021-11-08 NOTE — ANESTHESIA POSTPROCEDURE EVALUATION
Patient: Jann Harkins    Procedure: Procedure(s):  COLONOSCOPY with cecum polypectomy with cold snare       Diagnosis:Screening for colon cancer [Z12.11]  Diagnosis Additional Information: No value filed.    Anesthesia Type:  MAC    Note:  Disposition: Outpatient   Postop Pain Control: Uneventful            Sign Out: Well controlled pain   PONV: No   Neuro/Psych: Uneventful            Sign Out: Acceptable/Baseline neuro status   Airway/Respiratory: Uneventful            Sign Out: Acceptable/Baseline resp. status   CV/Hemodynamics: Uneventful            Sign Out: Acceptable CV status; No obvious hypovolemia; No obvious fluid overload   Other NRE: NONE   DID A NON-ROUTINE EVENT OCCUR? No           Last vitals:  Vitals Value Taken Time   /75 11/08/21 1245   Temp 36.3  C (97.3  F) 11/08/21 1301   Pulse 72 11/08/21 1248   Resp 16 11/08/21 1248   SpO2 98 % 11/08/21 1248   Vitals shown include unvalidated device data.    Electronically Signed By: Miriam Villanueva MD  November 8, 2021  2:09 PM

## 2021-11-08 NOTE — ANESTHESIA CARE TRANSFER NOTE
97.1  Patient: Jann Harkins    Procedure: Procedure(s):  COLONOSCOPY with cecum polypectomy with cold snare       Diagnosis: Screening for colon cancer [Z12.11]  Diagnosis Additional Information: No value filed.    Anesthesia Type:   MAC     Note:    Oropharynx: oropharynx clear of all foreign objects  Level of Consciousness: awake  Oxygen Supplementation: face mask  Level of Supplemental Oxygen (L/min / FiO2): 10  Independent Airway: airway patency satisfactory and stable  Dentition: dentition unchanged  Vital Signs Stable: post-procedure vital signs reviewed and stable  Report to RN Given: handoff report given  Patient transferred to: Phase II    Handoff Report: Identifed the Patient, Identified the Reponsible Provider, Reviewed the pertinent medical history, Discussed the surgical course, Reviewed Intra-OP anesthesia mangement and issues during anesthesia, Set expectations for post-procedure period and Allowed opportunity for questions and acknowledgement of understanding      Vitals:  Vitals Value Taken Time   /85 11/08/21 1235   Temp 97.1    Pulse 72 11/08/21 1236   Resp 18 11/08/21 1236   SpO2 100 % 11/08/21 1236   Vitals shown include unvalidated device data.    Electronically Signed By: MARTY Valverde CRNA  November 8, 2021  12:36 PM

## 2021-11-08 NOTE — OP NOTE
COLONOSCOPY REPORT    DATE OF SERVICE: 11/8/2021    ENDOSCOPIST: Alexis Arenas MD    PREOPERATIVE DIAGNOSIS: Screening colonoscopy    POSTOPERATIVE DIAGNOSIS: Colon polyp    COLONOSCOPY WITH polypectomy    OPERATIVE MEDICATIONS:  MAC per Anesthesia    PREP QUALITY: fair    DESCRIPTION OF PROCEDURE:  The patient was informed of  the risks, benefits and alternatives, and gave informed consent.  The patient had a stable cardiopulmonary status and was judged an adequate candidate for MAC.    A colonoscope was passed without difficulty from the anus to the cecum, which was recognized by appendiceal orifice and ileocecal valve.  The scope was slowly withdrawn to allow complete examination of the lower GI tract. The scope was retroflexed in the rectum.    FINDINGS WERE AS FOLLOWS:  1 sessile cecal polyp, removed and retrieved using a cold snare.  Multiple small and medium diverticula in left colon, not inflamed.  Small external hemorrhoids, not bleeding.    ESTIMATED BLOOD LOSS:  NONE    SPECIMEN OBTAINED:   1. Cecal polyp    POST-PROCEDURE DIAGNOSIS/IMPRESSION:    1. Colon polyp  2. Colonic diverticulosis  3. External hemorrhoids  4. Screening colonoscopy    PLAN:   1. OK to discharge home  2. Restart usual diet  3. Await path  4. Surveillance colonoscopy based upon path results  5. Follow up with primary provider as planned.    Alexis Arenas MD  [facility]  [phone]

## 2021-11-09 PROCEDURE — 88305 TISSUE EXAM BY PATHOLOGIST: CPT | Mod: 26 | Performed by: PATHOLOGY

## 2021-11-17 LAB — SPECIMEN STATUS: NORMAL

## 2021-11-29 LAB
PATH REPORT.ADDENDUM SPEC: NORMAL
PATH REPORT.ADDENDUM SPEC: NORMAL
PATH REPORT.COMMENTS IMP SPEC: NORMAL
PATH REPORT.COMMENTS IMP SPEC: NORMAL
PATH REPORT.FINAL DX SPEC: NORMAL
PATH REPORT.GROSS SPEC: NORMAL
PATH REPORT.MICROSCOPIC SPEC OTHER STN: NORMAL
PATH REPORT.RELEVANT HX SPEC: NORMAL
PHOTO IMAGE: NORMAL

## 2022-01-25 PROBLEM — M25.551 BILATERAL HIP PAIN: Status: RESOLVED | Noted: 2021-06-08 | Resolved: 2022-01-25

## 2022-01-25 PROBLEM — M25.552 BILATERAL HIP PAIN: Status: RESOLVED | Noted: 2021-06-08 | Resolved: 2022-01-25

## 2022-01-26 NOTE — PROGRESS NOTES
Discharge Note    Progress reporting period is from initial evaluation date (please see noted date below) to Jul 19, 2021.  Linked Episodes   Type: Episode: Status: Noted: Resolved: Last update: Updated by:   PHYSICAL THERAPY B hips 06/04/2021 Active 6/4/2021 7/19/2021  3:34 PM Mundo Clark, PT      Comments:       Jann failed to follow up and current status is unknown.  Please see information below for last relevant information on current status.  Patient seen for 5 visits.    SUBJECTIVE  Subjective changes noted by patient:  Has been walking up to 3 blocks. C/o L hip tightness and pain.  .  Current pain level is 8/10.     Previous pain level was  5/10.   Changes in function:  Yes (See Goal flowsheet attached for changes in current functional level)  Adverse reaction to treatment or activity: None    OBJECTIVE  Changes noted in objective findings: Pt cont.s to lack L hip ext during gait. Pt instructed to schedule follow up appt w/ Dr. Healy. Pt agreed w/ plan.     ASSESSMENT/PLAN  Diagnosis: B hip pain and OA   STG/LTGs have been met or progress has been made towards goals:  Yes, please see goal flowsheet for most current information  Assessment of Progress: current status is unknown.    Last current status: Pt is progressing slower than anticipated   Self Management Plans:  HEP  I have re-evaluated this patient and find that the nature, scope, duration and intensity of the therapy is appropriate for the medical condition of the patient.  Jann continues to require the following intervention to meet STG and LTG's:  HEP.    Recommendations:  Discharge with current home program.  Patient to follow up with MD as needed.    Please refer to the daily flowsheet for treatment today, total treatment time and time spent performing 1:1 timed codes.

## 2022-07-13 ENCOUNTER — LAB REQUISITION (OUTPATIENT)
Dept: LAB | Facility: CLINIC | Age: 62
End: 2022-07-13
Payer: COMMERCIAL

## 2022-07-13 DIAGNOSIS — T46.6X5A ADVERSE EFFECT OF ANTIHYPERLIPIDEMIC AND ANTIARTERIOSCLEROTIC DRUGS, INITIAL ENCOUNTER: ICD-10-CM

## 2022-07-13 LAB
ALBUMIN SERPL BCG-MCNC: 4.4 G/DL (ref 3.5–5.2)
ALP SERPL-CCNC: 110 U/L (ref 40–129)
ALT SERPL W P-5'-P-CCNC: 48 U/L (ref 10–50)
ANION GAP SERPL CALCULATED.3IONS-SCNC: 10 MMOL/L (ref 7–15)
AST SERPL W P-5'-P-CCNC: 27 U/L (ref 10–50)
BILIRUB SERPL-MCNC: 0.4 MG/DL
BUN SERPL-MCNC: 17.4 MG/DL (ref 8–23)
CALCIUM SERPL-MCNC: 9.9 MG/DL (ref 8.8–10.2)
CHLORIDE SERPL-SCNC: 103 MMOL/L (ref 98–107)
CK SERPL-CCNC: 154 U/L (ref 39–308)
CREAT SERPL-MCNC: 1 MG/DL (ref 0.67–1.17)
DEPRECATED HCO3 PLAS-SCNC: 26 MMOL/L (ref 22–29)
GFR SERPL CREATININE-BSD FRML MDRD: 85 ML/MIN/1.73M2
GLUCOSE SERPL-MCNC: 100 MG/DL (ref 70–99)
LIPASE SERPL-CCNC: 22 U/L (ref 13–60)
POTASSIUM SERPL-SCNC: 4.6 MMOL/L (ref 3.4–5.3)
PROT SERPL-MCNC: 7.8 G/DL (ref 6.4–8.3)
SODIUM SERPL-SCNC: 139 MMOL/L (ref 136–145)

## 2022-07-13 PROCEDURE — 80053 COMPREHEN METABOLIC PANEL: CPT | Mod: ORL | Performed by: FAMILY MEDICINE

## 2022-07-13 PROCEDURE — 82550 ASSAY OF CK (CPK): CPT | Mod: ORL | Performed by: FAMILY MEDICINE

## 2022-07-13 PROCEDURE — 83690 ASSAY OF LIPASE: CPT | Mod: ORL | Performed by: FAMILY MEDICINE

## 2022-08-26 ENCOUNTER — LAB REQUISITION (OUTPATIENT)
Dept: LAB | Facility: CLINIC | Age: 62
End: 2022-08-26

## 2022-08-26 DIAGNOSIS — E03.9 HYPOTHYROIDISM, UNSPECIFIED: ICD-10-CM

## 2022-08-26 DIAGNOSIS — R60.0 LOCALIZED EDEMA: ICD-10-CM

## 2022-08-26 DIAGNOSIS — E78.5 HYPERLIPIDEMIA, UNSPECIFIED: ICD-10-CM

## 2022-08-26 LAB
ALBUMIN MFR UR ELPH: 11.2 MG/DL
ALBUMIN SERPL BCG-MCNC: 4 G/DL (ref 3.5–5.2)
ALP SERPL-CCNC: 103 U/L (ref 40–129)
ALT SERPL W P-5'-P-CCNC: 46 U/L (ref 10–50)
ANION GAP SERPL CALCULATED.3IONS-SCNC: 8 MMOL/L (ref 7–15)
AST SERPL W P-5'-P-CCNC: 29 U/L (ref 10–50)
BILIRUB SERPL-MCNC: 0.4 MG/DL
BUN SERPL-MCNC: 14.2 MG/DL (ref 8–23)
CALCIUM SERPL-MCNC: 9.4 MG/DL (ref 8.8–10.2)
CHLORIDE SERPL-SCNC: 103 MMOL/L (ref 98–107)
CHOLEST SERPL-MCNC: 178 MG/DL
CREAT SERPL-MCNC: 1.05 MG/DL (ref 0.67–1.17)
CREAT UR-MCNC: 174 MG/DL
DEPRECATED HCO3 PLAS-SCNC: 27 MMOL/L (ref 22–29)
GFR SERPL CREATININE-BSD FRML MDRD: 80 ML/MIN/1.73M2
GLUCOSE SERPL-MCNC: 101 MG/DL (ref 70–99)
HDLC SERPL-MCNC: 34 MG/DL
LDLC SERPL CALC-MCNC: 126 MG/DL
NONHDLC SERPL-MCNC: 144 MG/DL
NT-PROBNP SERPL-MCNC: 64 PG/ML (ref 0–125)
POTASSIUM SERPL-SCNC: 4.7 MMOL/L (ref 3.4–5.3)
PROT SERPL-MCNC: 7.3 G/DL (ref 6.4–8.3)
PROT/CREAT 24H UR: 0.06 MG/MG CR (ref 0–0.2)
SODIUM SERPL-SCNC: 138 MMOL/L (ref 136–145)
T4 FREE SERPL-MCNC: 0.99 NG/DL (ref 0.9–1.7)
TRIGL SERPL-MCNC: 88 MG/DL
TSH SERPL DL<=0.005 MIU/L-ACNC: 7.76 UIU/ML (ref 0.3–4.2)

## 2022-08-26 PROCEDURE — 84156 ASSAY OF PROTEIN URINE: CPT | Performed by: FAMILY MEDICINE

## 2022-08-26 PROCEDURE — 80061 LIPID PANEL: CPT | Performed by: FAMILY MEDICINE

## 2022-08-26 PROCEDURE — 84443 ASSAY THYROID STIM HORMONE: CPT | Performed by: FAMILY MEDICINE

## 2022-08-26 PROCEDURE — 84439 ASSAY OF FREE THYROXINE: CPT | Performed by: FAMILY MEDICINE

## 2022-08-26 PROCEDURE — 80053 COMPREHEN METABOLIC PANEL: CPT | Performed by: FAMILY MEDICINE

## 2022-08-26 PROCEDURE — 83880 ASSAY OF NATRIURETIC PEPTIDE: CPT | Performed by: FAMILY MEDICINE

## 2023-02-13 ENCOUNTER — LAB REQUISITION (OUTPATIENT)
Dept: LAB | Facility: CLINIC | Age: 63
End: 2023-02-13

## 2023-02-13 DIAGNOSIS — I89.0 LYMPHEDEMA, NOT ELSEWHERE CLASSIFIED: ICD-10-CM

## 2023-02-13 DIAGNOSIS — R73.09 OTHER ABNORMAL GLUCOSE: ICD-10-CM

## 2023-02-13 PROCEDURE — 84439 ASSAY OF FREE THYROXINE: CPT | Performed by: FAMILY MEDICINE

## 2023-02-13 PROCEDURE — 83880 ASSAY OF NATRIURETIC PEPTIDE: CPT | Performed by: FAMILY MEDICINE

## 2023-02-13 PROCEDURE — 84443 ASSAY THYROID STIM HORMONE: CPT | Performed by: FAMILY MEDICINE

## 2023-02-14 LAB
NT-PROBNP SERPL-MCNC: 65 PG/ML (ref 0–900)
T4 FREE SERPL-MCNC: 1.12 NG/DL (ref 0.9–1.7)
TSH SERPL DL<=0.005 MIU/L-ACNC: 5.78 UIU/ML (ref 0.3–4.2)

## 2023-02-27 ENCOUNTER — LAB REQUISITION (OUTPATIENT)
Dept: LAB | Facility: CLINIC | Age: 63
End: 2023-02-27

## 2023-02-27 DIAGNOSIS — I89.0 LYMPHEDEMA, NOT ELSEWHERE CLASSIFIED: ICD-10-CM

## 2023-02-27 PROCEDURE — 80053 COMPREHEN METABOLIC PANEL: CPT | Performed by: FAMILY MEDICINE

## 2023-02-28 LAB
ALBUMIN SERPL BCG-MCNC: 4.2 G/DL (ref 3.5–5.2)
ALP SERPL-CCNC: 95 U/L (ref 40–129)
ALT SERPL W P-5'-P-CCNC: 48 U/L (ref 10–50)
ANION GAP SERPL CALCULATED.3IONS-SCNC: 18 MMOL/L (ref 7–15)
AST SERPL W P-5'-P-CCNC: 46 U/L (ref 10–50)
BILIRUB SERPL-MCNC: 0.7 MG/DL
BUN SERPL-MCNC: 30.6 MG/DL (ref 8–23)
CALCIUM SERPL-MCNC: 10 MG/DL (ref 8.8–10.2)
CHLORIDE SERPL-SCNC: 95 MMOL/L (ref 98–107)
CREAT SERPL-MCNC: 1.24 MG/DL (ref 0.67–1.17)
DEPRECATED HCO3 PLAS-SCNC: 23 MMOL/L (ref 22–29)
GFR SERPL CREATININE-BSD FRML MDRD: 65 ML/MIN/1.73M2
GLUCOSE SERPL-MCNC: 151 MG/DL (ref 70–99)
POTASSIUM SERPL-SCNC: 4.2 MMOL/L (ref 3.4–5.3)
PROT SERPL-MCNC: 7.8 G/DL (ref 6.4–8.3)
SODIUM SERPL-SCNC: 136 MMOL/L (ref 136–145)

## 2023-05-30 ENCOUNTER — TRANSFERRED RECORDS (OUTPATIENT)
Dept: HEALTH INFORMATION MANAGEMENT | Facility: CLINIC | Age: 63
End: 2023-05-30

## 2023-05-30 ENCOUNTER — LAB REQUISITION (OUTPATIENT)
Dept: LAB | Facility: CLINIC | Age: 63
End: 2023-05-30

## 2023-05-30 ENCOUNTER — MEDICAL CORRESPONDENCE (OUTPATIENT)
Dept: HEALTH INFORMATION MANAGEMENT | Facility: CLINIC | Age: 63
End: 2023-05-30
Payer: COMMERCIAL

## 2023-05-30 DIAGNOSIS — E11.9 TYPE 2 DIABETES MELLITUS WITHOUT COMPLICATIONS (H): ICD-10-CM

## 2023-05-30 LAB — HBA1C MFR BLD: 7.1 % (ref 4.2–6.1)

## 2023-05-30 PROCEDURE — 80061 LIPID PANEL: CPT | Performed by: FAMILY MEDICINE

## 2023-05-30 PROCEDURE — 82310 ASSAY OF CALCIUM: CPT | Performed by: FAMILY MEDICINE

## 2023-05-30 NOTE — PROGRESS NOTES
Medication Therapy Management (MTM) Encounter    ASSESSMENT:                            Medication Adherence/Access: No issues identified    Type 2 Diabetes: Patient is not meeting A1c goal of < 7%. Self monitoring of blood glucose is not at goal of fasting  mg/dL per patient report. Pt would benefit from GLP-1 agonist Ozempic is not covered, will try Victoza for blood sugar lowering, weight loss, cardiorenal benefits    HFpEF/Hypertension Patient is meeting BP goal of < 140/90mmHg.  Current weight is stable and diuretic dose seems to be appropriate.     Hypothyroidism: Stable and recent dose adjustment made      PLAN:                            1. Start Victoza 0.6mg injected under the skin every morming for 1 week, then increase to 1.2mg injected every morning. Take your shot within about 30 minutes prior to having breakfast.This medication helps your insulin to stay around longer to do its job, decreases appetite, and slows how you absorb sugar into your blood.  It is generally very effective in lowering blood sugar and helping with weight loss. There are some heart and kidney protective benefits long-term. Side effects we monitor for include nausea, vomiting, bloating, constipation or diarrhea, and abdominal pain. Make sure to store the box of Victoza in the fridge. The pen you are currently using can be kept on the counter at room temp.    Follow-up:   Future Appointments 5/31/2023 - 11/27/2023      Date Visit Type Length Department Provider     6/28/2023  3:00 PM NEW - MTM 60 min ETR  [place] [facility]  EXT Sarai Art, [facility]                    SUBJECTIVE/OBJECTIVE:                          Shaan Harkins is a 63 year old male coming in for an initial visit. He was referred to me from Gordon Bryant.      Reason for visit: Discuss switch to Victoza or other. Saw Dr. Bryant yesterday, had some updated labs, lipid panel and BMP pending.    Allergies/ADRs: None  Past Medical History: Reviewed in  chart  Tobacco: He reports that he has never smoked. He has never used smokeless tobacco.  Alcohol: none      Medication Adherence/Access: no issues reported    Type 2 Diabetes:   Was prescribed Ozempic, but not covered by insurance. Patient needs to try Bydureon, Byetta, or Victoza first. We discussed options, and he would like to try Victoza.    Blood Sugar Ranges (patient reported): fasting around 130's; afternoons around 140's after eating  Symptoms of low blood sugar? none.   Symptoms of high blood sugar? polyuria    Eye exam: none on file  Diet/Exercise: Doesn't drink much water. But will drink gatorade, sprite (16oz)  Aspirin: Taking 81mg daily and denies side effects   Statin: Rosuvastatin 10mg daily  ACEi/ARB: lisinopril  Urine Albumin (5/30/2023): normal   Date A1c   5/30/2023 7.1   2/13/23 7.0     Abstracted A1c result- sent 5/30/2023  Recent Labs   Lab Test 08/26/22  1420 09/03/20  1509   CHOL 178 121   HDL 34* 28*   * 69   TRIG 88 121       HFpEF/Hypertension:   lisinopril 5mg daily  bumetanide 2mg twice daily  Potassium chloride 20meq twice daily  NTG 0.4mg PRN chest pain - used 1 tablet last week and this resolved chest pain - is aware to refill his bottle within 3 months.  Compression stockings  Patient reports no current medication side effects.     Follows with [facility]  ECHO:  Date 8/22/2020, EF 55%  Patient is not complaining of HF symptoms .    Patient is measuring daily weights  and reports stable around 329lb on home scale.   Patient does not self-monitor blood pressure.   Patient is following a low sodium diet, is avoiding EtOH.  BP Readings from Last 3 Encounters:   05/31/23 110/84   05/30/23 106/72   02/27/2023 120/70     Wt Readings from Last 2 Encounters:   05/31/23 322 lb (146.1 kg)   05/30/23 322 lb     Last Comprehensive Metabolic Panel - labs from yesterday still pending:  Lab Results   Component Value Date     05/30/2023    POTASSIUM 5.0 05/30/2023     CHLORIDE 107 05/30/2023    CO2 21 (L) 05/30/2023    ANIONGAP 12 05/30/2023     (H) 05/30/2023    BUN 16.6 05/30/2023    CR 1.14 05/30/2023    GFRESTIMATED 72 05/30/2023    LUPILLO 9.6 05/30/2023       Hypothyroidism:  Levothyroxine 75 mcg daily - dose just increased by Dr. Bryant yesterday  Patient is having the following symptoms: none.   TSH   Date Value Ref Range Status   02/13/2023 5.78 (H) 0.30 - 4.20 uIU/mL Final   11/04/2020 3.36 0.30 - 5.00 uIU/mL Final     Free T4   Date Value Ref Range Status   02/13/2023 1.12 0.90 - 1.70 ng/dL Final         Today's Vitals: /84   Wt 322 lb (146.1 kg)   BMI 58.89 kg/m    ----------------      I spent 60 minutes with this patient today. All changes were made via collaborative practice agreement with Gordon Bryant MD. A copy of the visit note was provided to the patient's provider(s).    A summary of these recommendations was given to the patient.    Sarai Art, Pharm.D., [facility]  Medication Therapy Management Pharmacist  [phone]     Medication Therapy Recommendations  Type 2 diabetes mellitus without complication, without long-term current use of insulin (H)    Current Medication: liraglutide (VICTOZA) 18 MG/3ML solution   Rationale: Untreated condition - Needs additional medication therapy - Indication   Recommendation: Start Medication   Status: Accepted per CPA

## 2023-05-31 ENCOUNTER — OFFICE VISIT (OUTPATIENT)
Dept: PHARMACY | Facility: PHYSICIAN GROUP | Age: 63
End: 2023-05-31
Payer: COMMERCIAL

## 2023-05-31 VITALS — WEIGHT: 315 LBS | DIASTOLIC BLOOD PRESSURE: 84 MMHG | BODY MASS INDEX: 58.89 KG/M2 | SYSTOLIC BLOOD PRESSURE: 110 MMHG

## 2023-05-31 DIAGNOSIS — E03.9 HYPOTHYROIDISM, UNSPECIFIED TYPE: ICD-10-CM

## 2023-05-31 DIAGNOSIS — I50.30 HEART FAILURE WITH PRESERVED EJECTION FRACTION, NYHA CLASS I (H): ICD-10-CM

## 2023-05-31 DIAGNOSIS — E11.9 TYPE 2 DIABETES MELLITUS WITHOUT COMPLICATION, WITHOUT LONG-TERM CURRENT USE OF INSULIN (H): Primary | ICD-10-CM

## 2023-05-31 DIAGNOSIS — I10 HYPERTENSION, ESSENTIAL: ICD-10-CM

## 2023-05-31 LAB
ANION GAP SERPL CALCULATED.3IONS-SCNC: 12 MMOL/L (ref 7–15)
BUN SERPL-MCNC: 16.6 MG/DL (ref 8–23)
CALCIUM SERPL-MCNC: 9.6 MG/DL (ref 8.8–10.2)
CHLORIDE SERPL-SCNC: 107 MMOL/L (ref 98–107)
CHOLEST SERPL-MCNC: 111 MG/DL
CREAT SERPL-MCNC: 1.14 MG/DL (ref 0.67–1.17)
DEPRECATED HCO3 PLAS-SCNC: 21 MMOL/L (ref 22–29)
GFR SERPL CREATININE-BSD FRML MDRD: 72 ML/MIN/1.73M2
GLUCOSE SERPL-MCNC: 102 MG/DL (ref 70–99)
HDLC SERPL-MCNC: 27 MG/DL
LDLC SERPL CALC-MCNC: 60 MG/DL
NONHDLC SERPL-MCNC: 84 MG/DL
POTASSIUM SERPL-SCNC: 5 MMOL/L (ref 3.4–5.3)
SODIUM SERPL-SCNC: 140 MMOL/L (ref 136–145)
TRIGL SERPL-MCNC: 120 MG/DL

## 2023-05-31 PROCEDURE — 99605 MTMS BY PHARM NP 15 MIN: CPT | Performed by: PHARMACIST

## 2023-05-31 PROCEDURE — 99607 MTMS BY PHARM ADDL 15 MIN: CPT | Performed by: PHARMACIST

## 2023-05-31 RX ORDER — LIRAGLUTIDE 6 MG/ML
1.8 INJECTION SUBCUTANEOUS DAILY
COMMUNITY
End: 2024-06-21

## 2023-05-31 RX ORDER — BUMETANIDE 2 MG/1
2 TABLET ORAL 2 TIMES DAILY
COMMUNITY
Start: 2023-04-06

## 2023-05-31 RX ORDER — BLOOD SUGAR DIAGNOSTIC
STRIP MISCELLANEOUS
COMMUNITY
Start: 2023-03-23

## 2023-05-31 RX ORDER — LANCETS
EACH MISCELLANEOUS
COMMUNITY
Start: 2023-03-23

## 2023-05-31 RX ORDER — POTASSIUM CHLORIDE 1500 MG/1
1 TABLET, EXTENDED RELEASE ORAL
COMMUNITY
Start: 2023-03-12

## 2023-05-31 RX ORDER — ROSUVASTATIN CALCIUM 10 MG/1
1 TABLET, COATED ORAL
COMMUNITY
Start: 2023-05-12

## 2023-05-31 RX ORDER — LEVOTHYROXINE SODIUM 75 UG/1
1 TABLET ORAL DAILY
COMMUNITY
Start: 2023-05-30 | End: 2024-06-21

## 2023-05-31 NOTE — PATIENT INSTRUCTIONS
Recommendations from today's MTM visit:                                                      1. Start Victoza 0.6mg injected under the skin every morming for 1 week, then increase to 1.2mg injected every morning. Take your shot within about 30 minutes prior to having breakfast.This medication helps your insulin to stay around longer to do its job, decreases appetite, and slows how you absorb sugar into your blood.  It is generally very effective in lowering blood sugar and helping with weight loss. There are some heart and kidney protective benefits long-term. Side effects we monitor for include nausea, vomiting, bloating, constipation or diarrhea, and abdominal pain. Make sure to store the box of Victoza in the fridge. The pen you are currently using can be kept on the counter at room temp.    Follow-up:   Future Appointments 5/31/2023 - 11/27/2023        Date Visit Type Length Department Provider     6/28/2023  3:00 PM NEW - MTM 60 min ETR  [facility]  EXT Sarai Art, [facility]                      It was great to speak with you today.  I value your experience and would be very thankful for your time with providing feedback on our clinic survey. You may receive a survey via email or text message in the next few days.     To schedule another MTM appointment, please call the clinic directly or you may call the MTM scheduling line at [phone]    My Clinical Pharmacist's contact information:                                                      Please feel free to contact me with any questions or concerns you have.      Sarai Art, Pharm.D., [facility]  Medication Therapy Management Pharmacist

## 2023-06-28 ENCOUNTER — OFFICE VISIT (OUTPATIENT)
Dept: PHARMACY | Facility: PHYSICIAN GROUP | Age: 63
End: 2023-06-28
Payer: COMMERCIAL

## 2023-06-28 VITALS
BODY MASS INDEX: 56.81 KG/M2 | HEART RATE: 60 BPM | WEIGHT: 310.6 LBS | SYSTOLIC BLOOD PRESSURE: 108 MMHG | DIASTOLIC BLOOD PRESSURE: 62 MMHG

## 2023-06-28 DIAGNOSIS — E11.9 TYPE 2 DIABETES MELLITUS WITHOUT COMPLICATION, WITHOUT LONG-TERM CURRENT USE OF INSULIN (H): Primary | ICD-10-CM

## 2023-06-28 PROCEDURE — 99606 MTMS BY PHARM EST 15 MIN: CPT | Performed by: PHARMACIST

## 2023-06-28 PROCEDURE — 99607 MTMS BY PHARM ADDL 15 MIN: CPT | Performed by: PHARMACIST

## 2023-06-28 NOTE — PROGRESS NOTES
Medication Therapy Management (MTM) Encounter    ASSESSMENT:                            Medication Adherence/Access: No issues identified    Type 2 Diabetes: Patient is not meeting A1c goal of < 7%. Self monitoring of blood glucose is not at goal of fasting  mg/dL per patient report. Pt would benefit from GLP-1 agonist - needs further teaching on Victoza injection.    HFpEF/Hypertension: Stable. Patient is meeting BP goal of < 140/90mmHg.  Current weight is stable and diuretic dose seems to be appropriate.     PLAN:                            1. Start Victoza 0.6mg injected under the skin every morming for 1 week, then increase to 1.2mg injected every morning. Take your shot within about 30 minutes prior to having breakfast -- you can take this with your levothyroxine every morning.   This medication helps your insulin to stay around longer to do its job, decreases appetite, and slows how you absorb sugar into your blood.  It is generally very effective in lowering blood sugar and helping with weight loss. There are some heart and kidney protective benefits long-term. Side effects we monitor for include nausea, vomiting, bloating, constipation or diarrhea, and abdominal pain. Make sure to store the box of Victoza in the fridge. The pen you are currently using can be kept on the counter at room temp.    Follow-up:   Aug 02, 2023  3:00 PM  [facility] New with Sarai Art, [facility] ([facility] ) [phone]             SUBJECTIVE/OBJECTIVE:                          Shaan Harkins is a 63 year old male coming in for a follow-up visit from 5/31/23. He was referred to me from Gordon Bryant.    Reason for visit: Discuss victoza    Allergies/ADRs: None  Past Medical History: Reviewed in chart  Tobacco: He reports that he has never smoked. He has never used smokeless tobacco.  Alcohol: none    Medication Adherence/Access: no issues reported - has  not started victoza, not sure how to do the injection  Requests refill for test strips and lancets    Type 2 Diabetes:   Victoza - Rx'd last visit, but hasn't started yet. Not sure how to do the injection    Blood Sugar Ranges (patient reported): 134-150's  Symptoms of low blood sugar? none.   Symptoms of high blood sugar? polyuria  Diet/Exercise: Doesn't drink much water. But will drink gatorade, sprite (16oz)  Paying closer attention to portion sizes and getting more fruit and veggies in the diet  Trying to be active, get out side  Urine Albumin (5/30/2023): normal   Date A1c   5/30/2023 7.1   2/13/23 7.0   Abstracted A1c result- sent 5/30/2023    HFpEF/Hypertension:   lisinopril 5mg daily  bumetanide 2mg twice daily  Potassium chloride 20meq twice daily  NTG 0.4mg PRN chest pain - used 1 tablet this past week and this resolved chest pain - is aware to refill his bottle within 3 months.  Compression stockings  Patient reports no current medication side effects.     Follows with [facility]  ECHO:  Date 8/22/2020, EF 55%  Patient is complaining of HF symptoms: BLAS and SOB at rest which he reports is his baseline and is not new symptom   Patient is measuring daily weights  and reports stable around 315lb on home scale.   Patient does not self-monitor blood pressure.   Patient is following a low sodium diet, is avoiding EtOH.  BP Readings from Last 2 Encounters:   06/28/23 108/62   05/31/23 110/84     Pulse Readings from Last 1 Encounters:   06/28/23 60     Wt Readings from Last 2 Encounters:   06/28/23 310 lb 9.6 oz (140.9 kg)   05/31/23 322 lb (146.1 kg)     Lab Results   Component Value Date     05/30/2023    POTASSIUM 5.0 05/30/2023    CHLORIDE 107 05/30/2023    CO2 21 (L) 05/30/2023    ANIONGAP 12 05/30/2023     (H) 05/30/2023    BUN 16.6 05/30/2023    CR 1.14 05/30/2023    GFRESTIMATED 72 05/30/2023    LUPILLO 9.6 05/30/2023       Today's Vitals: /62   Pulse 60   Wt 310 lb 9.6  oz (140.9 kg)   BMI 56.81 kg/m    ----------------      I spent 45 minutes with this patient today. All changes were made via collaborative practice agreement with Gordon Bryant MD. A copy of the visit note was provided to the patient's provider(s).    A summary of these recommendations was given to the patient.    Sarai Art, Pharm.D., [facility]  Medication Therapy Management Pharmacist  [phone]     Medication Therapy Recommendations  Type 2 diabetes mellitus without complication, without long-term current use of insulin (H)    Current Medication: liraglutide (VICTOZA) 18 MG/3ML solution   Rationale: Does not understand instructions - Adherence - Adherence   Recommendation: Provide Education   Status: Patient Agreed - Adherence/Education

## 2023-06-28 NOTE — PATIENT INSTRUCTIONS
Recommendations from today's MTM visit:                                                      1. Start Victoza 0.6mg injected under the skin every morming for 1 week, then increase to 1.2mg injected every morning. Take your shot within about 30 minutes prior to having breakfast -- you can take this with your levothyroxine every morning.   This medication helps your insulin to stay around longer to do its job, decreases appetite, and slows how you absorb sugar into your blood.  It is generally very effective in lowering blood sugar and helping with weight loss. There are some heart and kidney protective benefits long-term. Side effects we monitor for include nausea, vomiting, bloating, constipation or diarrhea, and abdominal pain. Make sure to store the box of Victoza in the fridge. The pen you are currently using can be kept on the counter at room temp.    Follow-up:   Aug 02, 2023  3:00 PM  MTM New with Sarai Art [facility] ([facility] ) [phone]               It was great to speak with you today.  I value your experience and would be very thankful for your time with providing feedback on our clinic survey. You may receive a survey via email or text message in the next few days.     To schedule another MT appointment, please call the clinic directly or you may call the MTM scheduling line at [phone]    My Clinical Pharmacist's contact information:                                                      Please feel free to contact me with any questions or concerns you have.      Sarai Art, Pharm.D., BCACP  Medication Therapy Management Pharmacist

## 2023-08-02 ENCOUNTER — OFFICE VISIT (OUTPATIENT)
Dept: PHARMACY | Facility: PHYSICIAN GROUP | Age: 63
End: 2023-08-02
Payer: COMMERCIAL

## 2023-08-02 VITALS — BODY MASS INDEX: 55.16 KG/M2 | WEIGHT: 301.6 LBS

## 2023-08-02 DIAGNOSIS — E11.9 TYPE 2 DIABETES MELLITUS WITHOUT COMPLICATION, WITHOUT LONG-TERM CURRENT USE OF INSULIN (H): Primary | ICD-10-CM

## 2023-08-02 DIAGNOSIS — I50.30 HEART FAILURE WITH PRESERVED EJECTION FRACTION, NYHA CLASS I (H): ICD-10-CM

## 2023-08-02 PROCEDURE — 99606 MTMS BY PHARM EST 15 MIN: CPT | Performed by: PHARMACIST

## 2023-08-02 PROCEDURE — 99607 MTMS BY PHARM ADDL 15 MIN: CPT | Performed by: PHARMACIST

## 2023-08-02 NOTE — PATIENT INSTRUCTIONS
Recommendations from today's MTM visit:                                                      1. Increase Victoza to 1.2mg injected under the skin every morming. Your pen at home should last 2 weeks at this dose, then I sent a refill to the pharmacy.    2. You will can request a refill of the potassium from the pharmacy. I am also send a refill for your aspirin.    3. Follow-up with [facility] on new compression stockings.    Follow-up:   Sep 15, 2023  3:00 PM  MTM New with Sarai Art [facility] ([facility] ) [phone]          It was great to speak with you today.  I value your experience and would be very thankful for your time with providing feedback on our clinic survey. You may receive a survey via email or text message in the next few days.     To schedule another MT appointment, please call the clinic directly or you may call the MTM scheduling line at [phone]    My Clinical Pharmacist's contact information:                                                      Please feel free to contact me with any questions or concerns you have.      Sarai Art, Pharm.D., BCACP  Medication Therapy Management Pharmacist

## 2023-08-02 NOTE — PROGRESS NOTES
Medication Therapy Management (MTM) Encounter    ASSESSMENT:                            Medication Adherence/Access: No issues identified    Type 2 Diabetes: Patient is not meeting A1c goal of < 7%. Self monitoring of blood glucose is at goal of fasting  mg/dL per patient report. Pt would benefit from titrating Victoza for further blood sugar lowering, weight loss effects.    HFpEF/Hypertension: Stable. He will follow-up with [facility] on new compression stockings.    PLAN:                            1. Increase Victoza to 1.2mg injected under the skin every morming. Your pen at home should last 2 weeks at this dose, then I sent a refill to the pharmacy.    2. You will can request a refill of the potassium from the pharmacy. I am also send a refill for your aspirin.    3. Follow-up with [facility] on new compression stockings.    Follow-up:   Sep 15, 2023  3:00 PM  [facility] New with Sarai Art, [facility] ([facility] ) [phone]          SUBJECTIVE/OBJECTIVE:                          Shaan Harkins is a 63 year old male coming in for a follow-up visit from 6/28/23. He was referred to me from Gordon Bryant.      Reason for visit: victoza titration  Request refill on potassium    Allergies/ADRs: None  Past Medical History: Reviewed in chart  Tobacco: He reports that he has never smoked. He has never used smokeless tobacco.  Alcohol: none    Medication Adherence/Access: no issues reported    Type 2 Diabetes:    Victoza 0.6mg injected once daily - started last visit and has not increased to 1.2mg as instructed  Patient is not experiencing side effects.  Blood sugar monitoring: (patient reported): 117-127mg/dL, improved from 134-150's. Has been as high as 173mg/dL  Current diabetes symptoms: none  Diet/Exercise: Is drinking more water since last visit and is cutting back to 1 soda per day (sprite), gatorade  Paying closer attention to  portion sizes and getting more fruit and veggies in the diet  Trying to be active, get out side      Abstracted A1c result- sent 5/30/2023    HFpEF/Hypertension:   lisinopril 5mg daily  bumetanide 2mg twice daily  Potassium chloride 20meq twice daily  NTG 0.4mg PRN chest pain   Compression stockings - not wearing this today, says his socks are fraying, so hasn't been wearing regularly. He has had these measured and filled through [facility] in the past.  Patient reports no current medication side effects.     Follows with [facility]  ECHO:  Date 8/22/2020, EF 55%  Patient is complaining of HF symptoms: BLAS and SOB at rest which he reports is his baseline and is not new symptom ; swelling in L>R leg  Patient is measuring daily weights  and reports stable around 315lb on home scale.   Patient does not self-monitor blood pressure.   Patient is following a low sodium diet, is avoiding EtOH.  BP Readings from Last 2 Encounters:   06/28/23 108/62   05/31/23 110/84     Pulse Readings from Last 1 Encounters:   06/28/23 60     Wt Readings from Last 2 Encounters:   08/02/23 301 lb 9.6 oz (136.8 kg)   06/28/23 310 lb 9.6 oz (140.9 kg)     BMP: 5/20/23  Component Value        POTASSIUM 5.0    CHLORIDE 107    CO2 21 (L)    ANIONGAP 12     (H)    BUN 16.6    CR 1.14    GFRESTIMATED 72    LUPILLO 9.6       Today's Vitals: Wt 301 lb 9.6 oz (136.8 kg)   BMI 55.16 kg/m    ----------------      I spent 30minutes with this patient today. All changes were made via collaborative practice agreement with Gordon Bryant MD. A copy of the visit note was provided to the patient's provider(s).    A summary of these recommendations was given to the patient.    Sarai Art, Lukasz.D., [facility]  Medication Therapy Management Pharmacist  [phone]     Medication Therapy Recommendations  Type 2 diabetes mellitus without complication, without long-term current use of insulin (H)    Current Medication:  liraglutide (VICTOZA) 18 MG/3ML solution   Rationale: Dose too low - Dosage too low - Effectiveness   Recommendation: Provide Education   Status: Patient Agreed - Adherence/Education

## 2023-09-15 ENCOUNTER — OFFICE VISIT (OUTPATIENT)
Dept: PHARMACY | Facility: PHYSICIAN GROUP | Age: 63
End: 2023-09-15
Payer: COMMERCIAL

## 2023-09-15 VITALS
DIASTOLIC BLOOD PRESSURE: 72 MMHG | WEIGHT: 307.8 LBS | HEART RATE: 76 BPM | SYSTOLIC BLOOD PRESSURE: 102 MMHG | BODY MASS INDEX: 56.3 KG/M2

## 2023-09-15 DIAGNOSIS — I50.30 HEART FAILURE WITH PRESERVED EJECTION FRACTION, NYHA CLASS I (H): ICD-10-CM

## 2023-09-15 DIAGNOSIS — I10 HYPERTENSION, ESSENTIAL: ICD-10-CM

## 2023-09-15 DIAGNOSIS — E11.9 TYPE 2 DIABETES MELLITUS WITHOUT COMPLICATION, WITHOUT LONG-TERM CURRENT USE OF INSULIN (H): Primary | ICD-10-CM

## 2023-09-15 PROCEDURE — 99607 MTMS BY PHARM ADDL 15 MIN: CPT | Performed by: PHARMACIST

## 2023-09-15 PROCEDURE — 99606 MTMS BY PHARM EST 15 MIN: CPT | Performed by: PHARMACIST

## 2023-09-15 NOTE — PATIENT INSTRUCTIONS
Recommendations from today's MT visit:                                                      1. Increase Victoza to 1.8mg injected under the skin every morming. You can increase as of tomorrow. I let the pharmacy know we increased the dose and the next time you  a refill, you should receive 3 pens.    2. I sent a refill for the levothyroxine -- the thyroid medication.    3. We are going check your A1c (3 month blood sugar average) next month, you DO NOT have to be fasting.    Follow-up:   Oct 18, 2023  2:45 PM in lab and 3:00 PM with Sarai [facility]LO[facility] New with Sarai Art [facility] ([facility] ) [phone]     Sarai rAt, PharmKaryD., [facility]  Medication Therapy Management Pharmacist  [phone]    It was great to speak with you today.  I value your experience and would be very thankful for your time with providing feedback on our clinic survey. You may receive a survey via email or text message in the next few days.     To schedule another [facility] appointment, please call the clinic directly or you may call the scheduling line at [phone].    My Clinical Pharmacist's contact information:                                                      Please feel free to contact me with any questions or concerns you have.      Sarai Art, PharmKaryD., [facility]  Medication Therapy Management Pharmacist  [phone]

## 2023-09-15 NOTE — PROGRESS NOTES
Medication Therapy Management (MTM) Encounter    ASSESSMENT:                            Medication Adherence/Access: No issues identified    Type 2 Diabetes:   Patient is not meeting A1c goal of < 7%. Self monitoring of blood glucose is at goal of fasting  mg/dL per patient report. Pt would benefit from titrating Victoza for further blood sugar lowering, weight loss effects. Will plan to recheck A1c next month after 3 full months on Victoza.    HFpEF/Hypertension:   Stable.     PLAN:                            1. Increase Victoza to 1.8mg injected under the skin every morming. You can increase as of tomorrow. I let the pharmacy know we increased the dose and the next time you  a refill, you should receive 3 pens.    2. I sent a refill for the levothyroxine -- the thyroid medication.    3. We are going check your A1c (3 month blood sugar average) next month, you DO NOT have to be fasting.    Follow-up:   Oct 18, 2023  2:45 PM in lab and 3:00 PM with Sarai FLOOD New with Sarai Art [facility] ([facility] ) [phone]     Sarai Art, Pharm.D., [facility]  Medication Therapy Management Pharmacist  [phone]      SUBJECTIVE/OBJECTIVE:                          Shaan Harkins is a 63 year old male coming in for a follow-up visit from 8/2/23.     Reason for visit: victoza titration  Request refill on levothyroxine    Allergies/ADRs: None  Past Medical History: Reviewed in chart  Tobacco: He reports that he has never smoked. He has never used smokeless tobacco.  Alcohol: none    Medication Adherence/Access: no issues reported    Type 2 Diabetes:    Victoza 1.2mg injected once daily - increased last visit and is not experiencing side effects. Does notice not feeling as hungry  Blood sugar monitoring: (patient reported): 116-127mg/dL fasting; highest was 165 after eating two big macs the night before  Current diabetes symptoms: one  episode of shaking and a little dizzy with blood sugar of 150  Diet/Exercise: continuing to stay hydrated and cutting back to 1/2 soda per day (sprite), gatorade  Paying closer attention to portion sizes and getting more fruit and veggies in the diet  Trying to be active, get out side      Abstracted A1c result- sent 5/30/2023    HFpEF/Hypertension:   lisinopril 5mg daily  bumetanide 2mg twice daily  Potassium chloride 20meq twice daily  NTG 0.4mg PRN chest pain   Compression stockings - not wearing today, says he hasn't picked up new stockings we discussed last visit. He has had these measured and filled through [employer] in the past.  Patient reports no current medication side effects.     Follows with [facility]  ECHO:  Date 8/22/2020, EF 55%  Patient is complaining of HF symptoms: BLAS which he reports is his baseline and is not new symptom ; swelling in L=R leg  Patient is measuring daily weights  and reports stable around 306lb on home scale. Which is down from 315lbs last visit.   Patient does not self-monitor blood pressure.   Patient is following a low sodium diet, is avoiding alcohol.  BP Readings from Last 2 Encounters:   09/15/23 102/72   06/28/23 108/62     Pulse Readings from Last 1 Encounters:   09/15/23 76     Wt Readings from Last 2 Encounters:   09/15/23 307 lb 12.8 oz (139.6 kg)   08/02/23 301 lb 9.6 oz (136.8 kg)     BMP: 5/20/23  Component Value        POTASSIUM 5.0    CHLORIDE 107    CO2 21 (L)    ANIONGAP 12     (H)    BUN 16.6    CR 1.14    GFRESTIMATED 72    LUPILLO 9.6       Today's Vitals: /72   Pulse 76   Wt 307 lb 12.8 oz (139.6 kg)   BMI 56.30 kg/m    ----------------      I spent 30 minutes with this patient today. All changes were made via collaborative practice agreement with Gordon Bryant MD. A copy of the visit note was provided to the patient's provider(s).    A summary of these recommendations was given to the patient.    Sarai Fernandez  Rubens, Pharm.D., [facility]  Medication Therapy Management Pharmacist  [phone]     Medication Therapy Recommendations  Type 2 diabetes mellitus without complication, without long-term current use of insulin (H)    Current Medication: liraglutide (VICTOZA) 18 MG/3ML solution   Rationale: Dose too low - Dosage too low - Effectiveness   Recommendation: Increase Dose   Status: Accepted per CPA

## 2023-10-18 ENCOUNTER — OFFICE VISIT (OUTPATIENT)
Dept: PHARMACY | Facility: PHYSICIAN GROUP | Age: 63
End: 2023-10-18
Payer: COMMERCIAL

## 2023-10-18 ENCOUNTER — TRANSFERRED RECORDS (OUTPATIENT)
Dept: HEALTH INFORMATION MANAGEMENT | Facility: CLINIC | Age: 63
End: 2023-10-18
Payer: COMMERCIAL

## 2023-10-18 VITALS — SYSTOLIC BLOOD PRESSURE: 104 MMHG | DIASTOLIC BLOOD PRESSURE: 70 MMHG

## 2023-10-18 DIAGNOSIS — I50.30 HEART FAILURE WITH PRESERVED EJECTION FRACTION, NYHA CLASS I (H): ICD-10-CM

## 2023-10-18 DIAGNOSIS — E11.9 TYPE 2 DIABETES MELLITUS WITHOUT COMPLICATION, WITHOUT LONG-TERM CURRENT USE OF INSULIN (H): Primary | ICD-10-CM

## 2023-10-18 LAB — HBA1C MFR BLD: 6 % (ref 4.2–6.1)

## 2023-10-18 PROCEDURE — 99207 PR NO CHARGE LOS: CPT | Performed by: PHARMACIST

## 2023-10-18 NOTE — PATIENT INSTRUCTIONS
Recommendations from today's MTM visit:                                                      1. No changes to your medicines! Keep up the great work!    2. Your A1c today is looking great -- 6.0 and we want it to be under 7.    Follow-up:   Jan 19, 2024  3:00 PM  MTM New with Sarai Art [facility] ([facility] ) [phone]         Sarai Art, Pharm.THANIA., [facility]  Medication Therapy Management Pharmacist  [phone]    It was great to speak with you today.  I value your experience and would be very thankful for your time with providing feedback on our clinic survey. You may receive a survey via email or text message in the next few days.     To schedule another MTM appointment, please call the clinic directly or you may call the scheduling line at [phone].    My Clinical Pharmacist's contact information:                                                      Please feel free to contact me with any questions or concerns you have.      Sarai Art, PharmALEXIA., [facility]  Medication Therapy Management Pharmacist  [phone]

## 2023-10-18 NOTE — PROGRESS NOTES
Medication Therapy Management (MTM) Encounter    ASSESSMENT:                            Medication Adherence/Access: No issues identified    Type 2 Diabetes:   Patient is meeting A1c goal of < 7%, improved from previous.     HFpEF/Hypertension:   Stable.     PLAN:                            1. No changes to your medicines! Keep up the great work!    2. Your A1c today is looking great -- 6.0 and we want it to be under 7.    3. I have sent in refills for rosuvastatin, lisinopril, and bumetanide per your request. I also made sure that you have adequate refills on the Victoza.    Follow-up:   Jan 19, 2024  3:00 PM  MTM New with Sarai Art, [facility] ([facility] ) [phone]         Sarai Art, Pharm.D., [facility]  Medication Therapy Management Pharmacist  [phone]      SUBJECTIVE/OBJECTIVE:                          Shaan Harkins is a 63 year old male coming in for a follow-up visit from 9/15/23.     Reason for visit: victoza titration, A1c today  Request refill on rosuvastatin - has been out x1 week, also requesting lisinopril and bumetanide    Allergies/ADRs: None  Past Medical History: Reviewed in chart  Tobacco: He reports that he has never smoked. He has never used smokeless tobacco.  Alcohol: none    Medication Adherence/Access: no issues reported  Evicted from apartment due to lack of payment and currently staying with some friends. Hoping to move into new place in November    Type 2 Diabetes:    Victoza 1.8mg injected once daily - increased last visit and is not experiencing side effects. Does notice not feeling as hungry  Blood sugar monitoring: (patient reported): 140's fasting; has been as high as 207mg/dL and as low as 116mg/dL  Current diabetes symptoms: none reported  Diet/Exercise: continuing to stay hydrated and cutting back to 1/2 soda per day (sprite), gatorade  Paying closer attention to portion sizes and getting more  fruit and veggies in the diet  Trying to be active, get out side      Abstracted A1c result- sent 10/18/23     HFpEF/Hypertension:   lisinopril 5mg daily  bumetanide 2mg twice daily  Potassium chloride 20meq twice daily  NTG 0.4mg PRN chest pain   Compression stockings - not wearing today and hasn't been wearing at any of our recent visits. He has had these measured and filled through [employer] in the past.  Patient reports no current medication side effects.     [facility]  Patient is complaining of HF symptoms: BLAS which he reports is his baseline and is not new symptom ; swelling in L=R leg  Patient is measuring daily weights and reports stable around 306lb on home scale.   Patient does not self-monitor blood pressure.   Patient is following a low sodium diet, is avoiding alcohol.  BP Readings from Last 2 Encounters:   10/18/23 104/70   09/15/23 102/72     Pulse Readings from Last 1 Encounters:   09/15/23 76     Wt Readings from Last 2 Encounters:   09/15/23 307 lb 12.8 oz (139.6 kg)   08/02/23 301 lb 9.6 oz (136.8 kg)     BMP: 5/20/23  Component Value        POTASSIUM 5.0    CHLORIDE 107    CO2 21 (L)    ANIONGAP 12     (H)    BUN 16.6    CR 1.14    GFRESTIMATED 72    LUPILLO 9.6       Today's Vitals: /70   ----------------      I spent 30 minutes with this patient today. All changes were made via collaborative practice agreement with Gordon Bryant MD. A copy of the visit note was provided to the patient's provider(s).    A summary of these recommendations was given to the patient.    Sarai Art, Pharm.D., [facility]  Medication Therapy Management Pharmacist  [phone]     Medication Therapy Recommendations  No medication therapy recommendations to display

## 2024-01-19 ENCOUNTER — LAB REQUISITION (OUTPATIENT)
Dept: LAB | Facility: CLINIC | Age: 64
End: 2024-01-19
Payer: COMMERCIAL

## 2024-01-19 ENCOUNTER — OFFICE VISIT (OUTPATIENT)
Dept: PHARMACY | Facility: PHYSICIAN GROUP | Age: 64
End: 2024-01-19
Payer: COMMERCIAL

## 2024-01-19 VITALS
BODY MASS INDEX: 52.52 KG/M2 | SYSTOLIC BLOOD PRESSURE: 119 MMHG | DIASTOLIC BLOOD PRESSURE: 70 MMHG | HEART RATE: 72 BPM | WEIGHT: 296.4 LBS | HEIGHT: 63 IN

## 2024-01-19 DIAGNOSIS — E11.21 TYPE 2 DIABETES MELLITUS WITH DIABETIC NEPHROPATHY (H): ICD-10-CM

## 2024-01-19 DIAGNOSIS — E11.9 TYPE 2 DIABETES MELLITUS WITHOUT COMPLICATION, WITHOUT LONG-TERM CURRENT USE OF INSULIN (H): Primary | ICD-10-CM

## 2024-01-19 DIAGNOSIS — E78.5 HYPERLIPIDEMIA, UNSPECIFIED HYPERLIPIDEMIA TYPE: ICD-10-CM

## 2024-01-19 DIAGNOSIS — I50.30 HEART FAILURE WITH PRESERVED EJECTION FRACTION, NYHA CLASS I (H): ICD-10-CM

## 2024-01-19 DIAGNOSIS — I10 HYPERTENSION, ESSENTIAL: ICD-10-CM

## 2024-01-19 DIAGNOSIS — E03.9 HYPOTHYROIDISM, UNSPECIFIED TYPE: ICD-10-CM

## 2024-01-19 PROCEDURE — 82570 ASSAY OF URINE CREATININE: CPT | Mod: ORL | Performed by: FAMILY MEDICINE

## 2024-01-19 PROCEDURE — 99207 PR NO CHARGE LOS: CPT | Performed by: PHARMACIST

## 2024-01-19 NOTE — PATIENT INSTRUCTIONS
Recommendations from today's MTM visit:                                                      1. No changes to your medicines! Keep up the great work!    2. We will recheck your A1c and for any protein in the urine today.    3. I have sent in refills for your test strips    Follow-up: See Dr. Bryant in May for annual follow-up, and with me 3 months after that    Sarai Art, Pharm.D., BCACP  Medication Therapy Management Pharmacist  [phone]    It was great to speak with you today.  I value your experience and would be very thankful for your time with providing feedback on our clinic survey. You may receive a survey via email or text message in the next few days.     To schedule another MTM appointment, please call the clinic directly or you may call the scheduling line at [phone].    My Clinical Pharmacist's contact information:                                                      Please feel free to contact me with any questions or concerns you have.      Sarai Art, Pharm.THANIA., BCACP  Medication Therapy Management Pharmacist  [phone]

## 2024-01-19 NOTE — PROGRESS NOTES
"Medication Therapy Management (MTM) Encounter    ASSESSMENT:                            Medication Adherence/Access: No issues identified    Type 2 Diabetes:   Patient is meeting A1c goal of < 7%, labs pending time of visit.    HFpEF/Hypertension:   Stable.     Hyperlipidemia:   Stable.    Hypothyroidism:   Stable.      PLAN:                            1. No changes to your medicines! Keep up the great work!    2. We will recheck your A1c and for any protein in the urine today.    3. I have sent in refills for your test strips    Follow-up: See Dr. Bryant in May for annual follow-up, and with me 3 months after that    Sarai Art, Pharm.D., BCACP  Medication Therapy Management Pharmacist  [phone]      SUBJECTIVE/OBJECTIVE:                          Shaan Harkins is a 63 year old male coming in for a follow-up visit from 10/18/23.     Reason for visit: Med Review  Needs test strips refilled    Allergies/ADRs: None  Past Medical History: Reviewed in chart  Tobacco: He reports that he has never smoked. He has never used smokeless tobacco.  Alcohol: none    Medication Adherence/Access: no issues reported  Evicted from apartment in the fall due to lack of payment and currently staying with some friends. Is waiting for some 401K money    Type 2 Diabetes:    Victoza 1.8mg injected once daily - not experiencing side effects. Does notice not feeling as hungry  Blood sugar monitoring: (patient reported): 120's  Current diabetes symptoms: none reported  Diet/Exercise: continuing to stay hydrated and cutting back to 1/2 soda per day (sprite), gatorade  Paying closer attention to portion sizes and getting more fruit and veggies in the diet  Trying to be active, mall walking and walking around [employer]      Abstracted A1c result- sent 10/18/23   Urine Albumin: No results found for: \"UMALCR\"       HFpEF/Hypertension:   lisinopril 5mg daily  bumetanide 2mg twice daily  Potassium chloride 20meq twice daily  NTG 0.4mg PRN " [facility]   Cardiac Evaluation      Patient: Zenon Steel  YOB: 1953  Date: 11/2/23     Chief Complaint   Patient presents with    Hypertension    Hyperlipidemia          Referring provider: Chinmay Nguyen DO    History of Present Illness:   Ms. Tasha Miller is seen today for follow up of her hypertension and cardiomyopathy. An angiogram performed in 2006 revealed normal coronaries and EF 25%. Most recent ECHO 8/20/21 showed EF 50-55%    Today, Ms. Barroso states she has been fine. She checks her b/p regularly, it is running in 140's. Leno Guzman denies chest pain, palpitations, STOUT, dizziness, or edema. Past Medical History:   has a past medical history of Cardiomyopathy ([address]) and Hyperlipidemia. Current Outpatient Medications:     lisinopril (PRINIVIL;ZESTRIL) 40 MG tablet, Take 1 tablet by mouth twice daily, Disp: 180 tablet, Rfl: 3    carvedilol (COREG) 25 MG tablet, Take 1 tablet by mouth twice daily, Disp: 180 tablet, Rfl: 3    atorvastatin (LIPITOR) 20 MG tablet, Take 1 tablet by mouth once daily, Disp: 90 tablet, Rfl: 3    cloNIDine (CATAPRES) 0.1 MG tablet, Take 1 tablet by mouth twice daily, Disp: 180 tablet, Rfl: 3    alendronate (FOSAMAX) 70 MG tablet, Take 1 tablet by mouth once a week, Disp: , Rfl:     mometasone (NASONEX) 50 MCG/ACT nasal spray, 2 sprays by Nasal route daily, Disp: 1 Inhaler, Rfl: 1     Surgical History:   has a past surgical history that includes Tonsillectomy. Social History:  History     Social History    Marital Status: Single     Spouse Name: N/A     Number of Children: N/A    Years of Education: N/A     Occupational History    Retired from the [employer] History Main Topics    Smoking status: Never Smoker     Smokeless tobacco: Never Used    Alcohol Use: No    Drug Use: No    Sexually Active:        Family History:  family history includes Alzheimer's Disease in her mother; Asthma in her father.       Allergies:  Amlodipine     Review "chest pain   Compression stockings - not wearing today and hasn't been wearing at any of our recent visits. He has had these measured and filled through [employer] in the past.  Patient reports no current medication side effects.     Follows with [facility]  Patient is complaining of HF symptoms: BLAS which he reports is his baseline and is not new symptom ; swelling in L>R leg  Patient is measuring daily weights and reports stable around 306lb on home scale.   Patient does not self-monitor blood pressure.   Patient is following a low sodium diet, is avoiding alcohol.  BP Readings from Last 2 Encounters:   01/19/24 119/70   10/18/23 104/70     Pulse Readings from Last 1 Encounters:   01/19/24 72     Wt Readings from Last 2 Encounters:   01/19/24 296 lb 6.4 oz (134.4 kg)   09/15/23 307 lb 12.8 oz (139.6 kg)     Last Comprehensive Metabolic Panel:  Lab Results   Component Value Date     05/30/2023    POTASSIUM 5.0 05/30/2023    CHLORIDE 107 05/30/2023    CO2 21 (L) 05/30/2023    ANIONGAP 12 05/30/2023     (H) 05/30/2023    BUN 16.6 05/30/2023    CR 1.14 05/30/2023    GFRESTIMATED 72 05/30/2023    LUPILLO 9.6 05/30/2023       Hyperlipidemia:   Rosuvastatin 10mg daily  Patient reports no current medication side effects.  Lab Test 05/30/23 08/26/22   CHOL 111 178   HDL 27* 34*   LDL 60 126*   TRIG 120 88     Hypothyroidism:   Levothyroxine 75 mcg daily  Patient is having the following symptoms: none.   TSH   Date Value Ref Range Status   02/13/2023 5.78 (H) 0.30 - 4.20 uIU/mL Final   11/04/2020 3.36 0.30 - 5.00 uIU/mL Final     Free T4   Date Value Ref Range Status   02/13/2023 1.12 0.90 - 1.70 ng/dL Final         Today's Vitals: /70   Pulse 72   Ht 5' 3\" (1.6 m)   Wt 296 lb 6.4 oz (134.4 kg)   BMI 52.50 kg/m    ----------------      I spent 30 minutes with this patient today. All changes were made via collaborative practice agreement with Gordon Bryant MD. A copy of the visit note " was provided to the patient's provider(s).    A summary of these recommendations was given to the patient.    Sarai Art Pharm.D., [facility]  Medication Therapy Management Pharmacist  [phone]     Medication Therapy Recommendations  No medication therapy recommendations to display

## 2024-01-20 LAB
CREAT UR-MCNC: 171 MG/DL
MICROALBUMIN UR-MCNC: 32 MG/L
MICROALBUMIN/CREAT UR: 18.71 MG/G CR (ref 0–17)

## 2024-05-07 ENCOUNTER — LAB REQUISITION (OUTPATIENT)
Dept: LAB | Facility: CLINIC | Age: 64
End: 2024-05-07
Payer: COMMERCIAL

## 2024-05-07 ENCOUNTER — TRANSFERRED RECORDS (OUTPATIENT)
Dept: HEALTH INFORMATION MANAGEMENT | Facility: CLINIC | Age: 64
End: 2024-05-07

## 2024-05-07 DIAGNOSIS — I11.0 HYPERTENSIVE HEART DISEASE WITH HEART FAILURE (H): ICD-10-CM

## 2024-05-07 DIAGNOSIS — I89.0 LYMPHEDEMA, NOT ELSEWHERE CLASSIFIED: ICD-10-CM

## 2024-05-07 LAB — HBA1C MFR BLD: 7.3 % (ref 4.2–6.1)

## 2024-05-07 PROCEDURE — 84439 ASSAY OF FREE THYROXINE: CPT | Mod: ORL | Performed by: FAMILY MEDICINE

## 2024-05-07 PROCEDURE — 85027 COMPLETE CBC AUTOMATED: CPT | Mod: ORL | Performed by: FAMILY MEDICINE

## 2024-05-07 PROCEDURE — 84443 ASSAY THYROID STIM HORMONE: CPT | Mod: ORL | Performed by: FAMILY MEDICINE

## 2024-05-07 PROCEDURE — 84156 ASSAY OF PROTEIN URINE: CPT | Mod: ORL | Performed by: FAMILY MEDICINE

## 2024-05-07 PROCEDURE — 83880 ASSAY OF NATRIURETIC PEPTIDE: CPT | Mod: ORL | Performed by: FAMILY MEDICINE

## 2024-05-07 PROCEDURE — 80053 COMPREHEN METABOLIC PANEL: CPT | Mod: ORL | Performed by: FAMILY MEDICINE

## 2024-05-08 LAB
ERYTHROCYTE [DISTWIDTH] IN BLOOD BY AUTOMATED COUNT: 13.9 % (ref 10–15)
HCT VFR BLD AUTO: 38.7 % (ref 40–53)
HGB BLD-MCNC: 12.5 G/DL (ref 13.3–17.7)
MCH RBC QN AUTO: 27.7 PG (ref 26.5–33)
MCHC RBC AUTO-ENTMCNC: 32.3 G/DL (ref 31.5–36.5)
MCV RBC AUTO: 86 FL (ref 78–100)
PLATELET # BLD AUTO: 305 10E3/UL (ref 150–450)
RBC # BLD AUTO: 4.51 10E6/UL (ref 4.4–5.9)
WBC # BLD AUTO: 9.4 10E3/UL (ref 4–11)

## 2024-05-09 LAB
ALBUMIN MFR UR ELPH: <6 MG/DL
ALBUMIN SERPL BCG-MCNC: 3.9 G/DL (ref 3.5–5.2)
ALP SERPL-CCNC: 101 U/L (ref 40–150)
ALT SERPL W P-5'-P-CCNC: 37 U/L (ref 0–70)
ANION GAP SERPL CALCULATED.3IONS-SCNC: 16 MMOL/L (ref 7–15)
AST SERPL W P-5'-P-CCNC: 27 U/L (ref 0–45)
BILIRUB SERPL-MCNC: 0.5 MG/DL
BUN SERPL-MCNC: 31.4 MG/DL (ref 8–23)
CALCIUM SERPL-MCNC: 9 MG/DL (ref 8.8–10.2)
CHLORIDE SERPL-SCNC: 97 MMOL/L (ref 98–107)
CREAT SERPL-MCNC: 1.42 MG/DL (ref 0.67–1.17)
CREAT UR-MCNC: 22.5 MG/DL
DEPRECATED HCO3 PLAS-SCNC: 23 MMOL/L (ref 22–29)
EGFRCR SERPLBLD CKD-EPI 2021: 55 ML/MIN/1.73M2
GLUCOSE SERPL-MCNC: 132 MG/DL (ref 70–99)
NT-PROBNP SERPL-MCNC: 59 PG/ML (ref 0–900)
POTASSIUM SERPL-SCNC: 4 MMOL/L (ref 3.4–5.3)
PROT SERPL-MCNC: 7.8 G/DL (ref 6.4–8.3)
PROT/CREAT 24H UR: NORMAL MG/G{CREAT}
SODIUM SERPL-SCNC: 136 MMOL/L (ref 135–145)
T4 FREE SERPL-MCNC: 1.29 NG/DL (ref 0.9–1.7)
TSH SERPL DL<=0.005 MIU/L-ACNC: 6.05 UIU/ML (ref 0.3–4.2)

## 2024-06-10 ENCOUNTER — LAB REQUISITION (OUTPATIENT)
Dept: LAB | Facility: CLINIC | Age: 64
End: 2024-06-10
Payer: COMMERCIAL

## 2024-06-10 DIAGNOSIS — D64.9 ANEMIA, UNSPECIFIED: ICD-10-CM

## 2024-06-10 DIAGNOSIS — E03.9 HYPOTHYROIDISM, UNSPECIFIED: ICD-10-CM

## 2024-06-10 DIAGNOSIS — E11.21 TYPE 2 DIABETES MELLITUS WITH DIABETIC NEPHROPATHY (H): ICD-10-CM

## 2024-06-10 LAB
ANION GAP SERPL CALCULATED.3IONS-SCNC: 11 MMOL/L (ref 7–15)
BASOPHILS # BLD AUTO: 0.1 10E3/UL (ref 0–0.2)
BASOPHILS NFR BLD AUTO: 1 %
BUN SERPL-MCNC: 29.7 MG/DL (ref 8–23)
CALCIUM SERPL-MCNC: 9.5 MG/DL (ref 8.8–10.2)
CHLORIDE SERPL-SCNC: 98 MMOL/L (ref 98–107)
CHOLEST SERPL-MCNC: 115 MG/DL
CREAT SERPL-MCNC: 1.48 MG/DL (ref 0.67–1.17)
CREAT UR-MCNC: 65.5 MG/DL
DEPRECATED HCO3 PLAS-SCNC: 27 MMOL/L (ref 22–29)
EGFRCR SERPLBLD CKD-EPI 2021: 53 ML/MIN/1.73M2
EOSINOPHIL # BLD AUTO: 0.4 10E3/UL (ref 0–0.7)
EOSINOPHIL NFR BLD AUTO: 5 %
ERYTHROCYTE [DISTWIDTH] IN BLOOD BY AUTOMATED COUNT: 14.1 % (ref 10–15)
FASTING STATUS PATIENT QL REPORTED: ABNORMAL
FASTING STATUS PATIENT QL REPORTED: ABNORMAL
GLUCOSE SERPL-MCNC: 114 MG/DL (ref 70–99)
HCT VFR BLD AUTO: 38.5 % (ref 40–53)
HDLC SERPL-MCNC: 30 MG/DL
HGB BLD-MCNC: 12 G/DL (ref 13.3–17.7)
IMM GRANULOCYTES # BLD: 0 10E3/UL
IMM GRANULOCYTES NFR BLD: 1 %
IRON BINDING CAPACITY (ROCHE): 266 UG/DL (ref 240–430)
IRON SATN MFR SERPL: 25 % (ref 15–46)
IRON SERPL-MCNC: 67 UG/DL (ref 61–157)
LDLC SERPL CALC-MCNC: 62 MG/DL
LYMPHOCYTES # BLD AUTO: 1.5 10E3/UL (ref 0.8–5.3)
LYMPHOCYTES NFR BLD AUTO: 19 %
MCH RBC QN AUTO: 27.7 PG (ref 26.5–33)
MCHC RBC AUTO-ENTMCNC: 31.2 G/DL (ref 31.5–36.5)
MCV RBC AUTO: 89 FL (ref 78–100)
MICROALBUMIN UR-MCNC: <12 MG/L
MICROALBUMIN/CREAT UR: NORMAL MG/G{CREAT}
MONOCYTES # BLD AUTO: 1 10E3/UL (ref 0–1.3)
MONOCYTES NFR BLD AUTO: 12 %
NEUTROPHILS # BLD AUTO: 5.1 10E3/UL (ref 1.6–8.3)
NEUTROPHILS NFR BLD AUTO: 62 %
NONHDLC SERPL-MCNC: 85 MG/DL
NRBC # BLD AUTO: 0 10E3/UL
NRBC BLD AUTO-RTO: 0 /100
PLATELET # BLD AUTO: 336 10E3/UL (ref 150–450)
POTASSIUM SERPL-SCNC: 4.2 MMOL/L (ref 3.4–5.3)
RBC # BLD AUTO: 4.33 10E6/UL (ref 4.4–5.9)
RETICS # AUTO: 0.08 10E6/UL (ref 0.03–0.1)
RETICS/RBC NFR AUTO: 1.8 % (ref 0.5–2)
SODIUM SERPL-SCNC: 136 MMOL/L (ref 135–145)
T4 FREE SERPL-MCNC: 1.24 NG/DL (ref 0.9–1.7)
TRIGL SERPL-MCNC: 116 MG/DL
TSH SERPL DL<=0.005 MIU/L-ACNC: 7.15 UIU/ML (ref 0.3–4.2)
VIT B12 SERPL-MCNC: 453 PG/ML (ref 232–1245)
WBC # BLD AUTO: 8.1 10E3/UL (ref 4–11)

## 2024-06-10 PROCEDURE — 83550 IRON BINDING TEST: CPT | Mod: ORL | Performed by: FAMILY MEDICINE

## 2024-06-10 PROCEDURE — 80048 BASIC METABOLIC PNL TOTAL CA: CPT | Mod: ORL | Performed by: FAMILY MEDICINE

## 2024-06-10 PROCEDURE — 84443 ASSAY THYROID STIM HORMONE: CPT | Mod: ORL | Performed by: FAMILY MEDICINE

## 2024-06-10 PROCEDURE — 82570 ASSAY OF URINE CREATININE: CPT | Mod: ORL | Performed by: FAMILY MEDICINE

## 2024-06-10 PROCEDURE — 84439 ASSAY OF FREE THYROXINE: CPT | Mod: ORL | Performed by: FAMILY MEDICINE

## 2024-06-10 PROCEDURE — 85025 COMPLETE CBC W/AUTO DIFF WBC: CPT | Mod: ORL | Performed by: FAMILY MEDICINE

## 2024-06-10 PROCEDURE — 82746 ASSAY OF FOLIC ACID SERUM: CPT | Mod: ORL | Performed by: FAMILY MEDICINE

## 2024-06-10 PROCEDURE — 80061 LIPID PANEL: CPT | Mod: ORL | Performed by: FAMILY MEDICINE

## 2024-06-10 PROCEDURE — 82607 VITAMIN B-12: CPT | Mod: ORL | Performed by: FAMILY MEDICINE

## 2024-06-10 PROCEDURE — 85045 AUTOMATED RETICULOCYTE COUNT: CPT | Mod: ORL | Performed by: FAMILY MEDICINE

## 2024-06-11 LAB — FOLATE SERPL-MCNC: 5.8 NG/ML (ref 4.6–34.8)

## 2024-06-13 LAB
PATH REPORT.COMMENTS IMP SPEC: NORMAL
PATH REPORT.FINAL DX SPEC: NORMAL
PATH REPORT.MICROSCOPIC SPEC OTHER STN: NORMAL

## 2024-06-13 PROCEDURE — 99207 BLOOD MORPHOLOGY PATHOLOGIST REVIEW: CPT | Performed by: PATHOLOGY

## 2024-06-21 ENCOUNTER — OFFICE VISIT (OUTPATIENT)
Dept: PHARMACY | Facility: PHYSICIAN GROUP | Age: 64
End: 2024-06-21
Payer: COMMERCIAL

## 2024-06-21 VITALS
SYSTOLIC BLOOD PRESSURE: 100 MMHG | BODY MASS INDEX: 55.78 KG/M2 | HEIGHT: 63 IN | DIASTOLIC BLOOD PRESSURE: 60 MMHG | WEIGHT: 314.8 LBS | HEART RATE: 68 BPM

## 2024-06-21 DIAGNOSIS — I10 HYPERTENSION, ESSENTIAL: ICD-10-CM

## 2024-06-21 DIAGNOSIS — I50.30 HEART FAILURE WITH PRESERVED EJECTION FRACTION, NYHA CLASS I (H): ICD-10-CM

## 2024-06-21 DIAGNOSIS — E78.5 HYPERLIPIDEMIA, UNSPECIFIED HYPERLIPIDEMIA TYPE: ICD-10-CM

## 2024-06-21 DIAGNOSIS — E03.9 HYPOTHYROIDISM, UNSPECIFIED TYPE: ICD-10-CM

## 2024-06-21 DIAGNOSIS — E11.9 TYPE 2 DIABETES MELLITUS WITHOUT COMPLICATION, WITHOUT LONG-TERM CURRENT USE OF INSULIN (H): Primary | ICD-10-CM

## 2024-06-21 PROCEDURE — 99207 PR NO CHARGE LOS: CPT | Performed by: PHARMACIST

## 2024-06-21 RX ORDER — SEMAGLUTIDE 0.68 MG/ML
0.5 INJECTION, SOLUTION SUBCUTANEOUS
COMMUNITY
Start: 2024-06-06

## 2024-06-21 RX ORDER — LEVOTHYROXINE SODIUM 100 UG/1
100 TABLET ORAL DAILY
COMMUNITY
Start: 2024-06-14

## 2024-06-21 NOTE — PROGRESS NOTES
Medication Therapy Management (MTM) Encounter    ASSESSMENT:                            Medication Adherence/Access: No issues identified    Type 2 Diabetes:   Patient is not meeting A1c goal of < 7%  Titrate Ozempic for further blood sugar lowering, weight loss, and cardiorenal benefits    HFpEF/Hypertension:   Stable. Due for follow-up with cardiology.    Hyperlipidemia:   Stable.    Hypothyroidism:   Will be due for recheck of labs with recent change to levothyroxine dose.      PLAN:                            1. Increase Ozempic to 0.5mg injected once weekly. You should have about 3 doses left in your current pen. When this is gone then increase to 1mg pen and continue injecting once weekly    2. Schedule a follow-up visit with your heart doctor and consider scheduling a phone visit with Dr. Bryant to review your recent lab results.    Follow-up: Return in 1 month (on 7/24/2024) for Medication dose check, at 2:30pm.        SUBJECTIVE/OBJECTIVE:                          Shaan Harkins is a 63 year old male seen for a follow-up visit.     Reason for visit: Med Review    Allergies/ADRs: None  Past Medical History: Reviewed in chart  Tobacco: He reports that he has never smoked. He has never used smokeless tobacco.  Alcohol: none    Medication Adherence/Access: no issues reported    Type 2 Diabetes:    Ozempic 0.25mg injected once weekly - changed after last A1c and Victoza no longer covered by insurance. He did have a few episodes of vomiting, which has resolved since moving injection to thigh from stomach.    Current diabetes symptoms: none reported  Blood sugar monitoring: (patient reported): 120-150's, no lows reported    Medication History:  Victoza 1.8mg injected once daily - not experiencing side effects. Does notice not feeling as hungry  Abstracted A1c result 06/21/24 -  7.3 (5/7/24)    HFpEF/Hypertension:   lisinopril 5mg daily  bumetanide 2mg 2 tablets twice daily  Potassium chloride 20meq twice daily  NTG  "0.4mg PRN chest pain   Compression stockings/wraps, now following with lymphedema clinic.  Patient reports no current medication side effects.     Follows with [facility], no upcoming cardiology visit schedule. Lymphedema appointment scheduled for 7/1.  Patient is complaining of HF symptoms: BLAS which he reports is his baseline and is not new symptom  Patient is measuring daily weights and reports stable   Patient does not self-monitor blood pressure.   Patient is following a low sodium diet, is avoiding alcohol.  BP Readings from Last 2 Encounters:   06/10/24 100/60   01/19/24 119/70     Pulse Readings from Last 1 Encounters:   06/10/24 68     Wt Readings from Last 2 Encounters:   06/10/24 314 lb 12.8 oz (142.8 kg)   01/19/24 296 lb 6.4 oz (134.4 kg)     Last Comprehensive Metabolic Panel:  Lab Results   Component Value Date     06/10/2024    POTASSIUM 4.2 06/10/2024    CHLORIDE 98 06/10/2024    CO2 27 06/10/2024    ANIONGAP 11 06/10/2024     (H) 06/10/2024    BUN 29.7 (H) 06/10/2024    CR 1.48 (H) 06/10/2024    GFRESTIMATED 53 (L) 06/10/2024    LUPILLO 9.5 06/10/2024       Hyperlipidemia:   Rosuvastatin 10mg daily  Patient reports no current medication side effects.  Recent Labs   Lab Test 06/10/24  1604 05/30/23  1604   CHOL 115 111   HDL 30* 27*   LDL 62 60   TRIG 116 120       Hypothyroidism:   Levothyroxine 100 mcg daily - dose adjusted recently  Patient is having the following symptoms: none.   TSH   Date Value Ref Range Status   06/10/2024 7.15 (H) 0.30 - 4.20 uIU/mL Final   11/04/2020 3.36 0.30 - 5.00 uIU/mL Final     Free T4   Date Value Ref Range Status   06/10/2024 1.24 0.90 - 1.70 ng/dL Final         Today's Vitals: /60   Pulse 68   Ht 5' 3\" (1.6 m)   Wt 314 lb 12.8 oz (142.8 kg)   BMI 55.76 kg/m    ----------------      I spent 30 minutes with this patient today. All changes were made via collaborative practice agreement with Gordon Bryant MD. A copy of the visit " note was provided to the patient's provider(s).    A summary of these recommendations was given to the patient.    Lukasz Robison.D., [facility]  Medication Therapy Management Pharmacist  [phone]     Medication Therapy Recommendations  Type 2 diabetes mellitus without complication, without long-term current use of insulin (H)    Current Medication: OZEMPIC, 0.25 OR 0.5 MG/DOSE, 2 MG/3ML pen   Rationale: Dose too low - Dosage too low - Effectiveness   Recommendation: Increase Dose - Ozempic (0.25 or 0.5 MG/DOSE) 2 MG/1.5ML Sopn   Status: Accepted per CPA

## 2024-06-21 NOTE — PATIENT INSTRUCTIONS
Recommendations from today's MTM visit:                                                      1. Increase Ozempic to 0.5mg injected once weekly. You should have about 3 doses left in your current pen. When this is gone then increase to 1mg pen and continue injecting once weekly    2. Schedule a follow-up visit with your heart doctor and consider scheduling a phone visit with Dr. Bryant to review your recent lab results.    Follow-up: Return in 1 month (on 7/24/2024) for Medication dose check, at 2:30pm.      It was great to speak with you today.  I value your experience and would be very thankful for your time with providing feedback on our clinic survey. You may receive a survey via email or text message in the next few days.     To schedule another MTM appointment, please call the clinic directly or you may call the scheduling line at [phone].    My Clinical Pharmacist's contact information:                                                      Please feel free to contact me with any questions or concerns you have.      Sarai Art, Pharm.D., BCACP  Medication Therapy Management Pharmacist  [phone]

## 2024-07-23 NOTE — PROGRESS NOTES
Medication Therapy Management (MTM) Encounter    ASSESSMENT:                            Medication Adherence/Access: No issues identified    Type 2 Diabetes:   Patient is not meeting A1c goal of < 7%, plan to recheck next month when due and after being on Ozempic x3 months  Will not titrate at this time due to side effects, but may consider at next visit.    HFpEF/Hypertension:   Stable.       PLAN:                            1. Stay on Ozempic to 0.5mg injected once weekly for now. We discussed that the side effects, like throwing up, usually do get better over time.     Follow-up: Return in 6 weeks (on 9/4/2024) for Medication Therapy Management, Medication dose check.        SUBJECTIVE/OBJECTIVE:                          Shaan Harkins is a 63 year old male seen for a follow-up visit.     Reason for visit: Med Review    Allergies/ADRs: None  Past Medical History: Reviewed in chart  Tobacco: He reports that he has never smoked. He has never used smokeless tobacco.  Alcohol: none    Medication Adherence/Access: no issues reported  In own apartment now - moved in April    Type 2 Diabetes:    Ozempic 0.5mg injected once weekly on Fridays - increased last visit. Has taken 4 doses so far. Reports he is vomiting about 30 minutes after taking his shot each week, but no further vomiting throughout the week. He did not throw up after his most recent shot last Friday. No other symptoms of nausea throughout the week  Medication History:  Victoza stopped being covered by insurance and after last A1c    Current diabetes symptoms: none reported  Blood sugar monitoring: (patient reported): 120-150's, no lows reported  Diet: Used to eat out a lot; trying to eat more at home  Breakfast: cereal or [place] toast (without syrup)  Lunch: [place] ([place], cheese, and bread)  Dinner: Canned soup or chili        Heart Failure   HFpEF (>/=50%)   Lisinopril 5mg daily  bumetanide 2mg 2 tablets twice daily  Potassium chloride 20meq twice  daily  NTG 0.4mg PRN chest pain   Patient reports no current medication side effects.       Compression stockings/wraps, now following with lymphedema clinic. Has missed last few appointments due to vomiting  Follows with [facility], no upcoming cardiology visit scheduled.   Patient is complaining of HF symptoms: BLAS which he reports is his baseline and is not new symptom  Patient is not measuring daily weights - hard time standing on the scale at home by himself; notes thighs are still puffy, but feet and ankles are back to normal, no issues getting shoes on at this time  Patient does not self-monitor blood pressure.   Patient is following a low sodium diet, is avoiding alcohol.           Today's Vitals: /68   Pulse 81   Wt 295 lb 12.8 oz (134.2 kg)   BMI 52.40 kg/m    ----------------      I spent 30 minutes with this patient today. All changes were made via collaborative practice agreement with Gordon Bryant MD. A copy of the visit note was provided to the patient's provider(s).    A summary of these recommendations was given to the patient.    Sarai Art, Pharm.D., BCACP  Medication Therapy Management Pharmacist  [phone]     Medication Therapy Recommendations  No medication therapy recommendations to display

## 2024-07-24 ENCOUNTER — OFFICE VISIT (OUTPATIENT)
Dept: PHARMACY | Facility: PHYSICIAN GROUP | Age: 64
End: 2024-07-24
Payer: COMMERCIAL

## 2024-07-24 VITALS
DIASTOLIC BLOOD PRESSURE: 68 MMHG | SYSTOLIC BLOOD PRESSURE: 107 MMHG | WEIGHT: 295.8 LBS | HEART RATE: 81 BPM | BODY MASS INDEX: 52.4 KG/M2

## 2024-07-24 DIAGNOSIS — I50.30 HEART FAILURE WITH PRESERVED EJECTION FRACTION, NYHA CLASS I (H): ICD-10-CM

## 2024-07-24 DIAGNOSIS — E11.9 TYPE 2 DIABETES MELLITUS WITHOUT COMPLICATION, WITHOUT LONG-TERM CURRENT USE OF INSULIN (H): Primary | ICD-10-CM

## 2024-07-24 PROCEDURE — 99207 PR NO CHARGE LOS: CPT | Performed by: PHARMACIST

## 2024-07-24 NOTE — PATIENT INSTRUCTIONS
Recommendations from today's MTM visit:                                                      1. Stay on Ozempic to 0.5mg injected once weekly for now. We discussed that the side effects, like throwing up, usually do get better over time.     Follow-up: Return in 6 weeks (on 9/4/2024) for Medication Therapy Management, Medication dose check.      It was great to speak with you today.  I value your experience and would be very thankful for your time with providing feedback on our clinic survey. You may receive a survey via email or text message in the next few days.     To schedule another MTM appointment, please call the clinic directly or you may call the scheduling line at [phone].    My Clinical Pharmacist's contact information:                                                      Please feel free to contact me with any questions or concerns you have.      Sarai Art, Pharm.D., [facility]  Medication Therapy Management Pharmacist  [phone]

## 2024-09-04 ENCOUNTER — OFFICE VISIT (OUTPATIENT)
Dept: PHARMACY | Facility: PHYSICIAN GROUP | Age: 64
End: 2024-09-04
Payer: COMMERCIAL

## 2024-09-04 ENCOUNTER — TRANSFERRED RECORDS (OUTPATIENT)
Dept: HEALTH INFORMATION MANAGEMENT | Facility: CLINIC | Age: 64
End: 2024-09-04
Payer: COMMERCIAL

## 2024-09-04 VITALS — DIASTOLIC BLOOD PRESSURE: 70 MMHG | WEIGHT: 302 LBS | SYSTOLIC BLOOD PRESSURE: 112 MMHG | BODY MASS INDEX: 53.5 KG/M2

## 2024-09-04 DIAGNOSIS — E11.9 TYPE 2 DIABETES MELLITUS WITHOUT COMPLICATION, WITHOUT LONG-TERM CURRENT USE OF INSULIN (H): Primary | ICD-10-CM

## 2024-09-04 DIAGNOSIS — I10 HYPERTENSION, ESSENTIAL: ICD-10-CM

## 2024-09-04 DIAGNOSIS — I50.30 HEART FAILURE WITH PRESERVED EJECTION FRACTION, NYHA CLASS I (H): ICD-10-CM

## 2024-09-04 LAB — HBA1C MFR BLD: 5.9 % (ref 4.2–6.1)

## 2024-09-04 PROCEDURE — 99207 PR NO CHARGE LOS: CPT | Performed by: PHARMACIST

## 2024-09-04 NOTE — PROGRESS NOTES
"Medication Therapy Management (MTM) Encounter    ASSESSMENT:                            Medication Adherence/Access: No issues identified    Type 2 Diabetes:   Patient is meeting A1c goal of < 7%!    HFpEF/Hypertension:   Stable.       PLAN:                            1. No changes to medications today. A1c looks GREAT!    2. Test your blood sugar in the morning before breakfast (goal of 80-130mg/dL), 2 hours after a meal (goal less than 180mg/dL), and anytime you feel it may be low (feeling shaky, dizzy, sweaty, weak). If your blood sugar is below 70mg/dL, have something to eat to bring it up. If your blood sugar is NOT below 70mg/dL, take a 5 minute break, have a glass water, and let the feeling pass. This is your body adjusting to lower and more normal blood sugar levels.    3. Schedule a follow-up with Dr. Bryant before the end of the year.      Follow-up: Return in about 6 months (around 3/4/2025) for Medication Therapy Management.      SUBJECTIVE/OBJECTIVE:                          Shaan Harkins is a 64 year old male seen for a follow-up visit.     Reason for visit: Med Review    Allergies/ADRs: None  Past Medical History: Reviewed in chart  Tobacco: He reports that he has never smoked. He has never used smokeless tobacco.  Alcohol: none    Medication Adherence/Access: no issues reported  In own apartment now - moved in April    Type 2 Diabetes:    Ozempic 0.5mg injected once weekly on Fridays - stayed on this dose after last visit due to vomiting about 30 minutes after taking his shot each week, and this is now resolved completely. He reports having a little water before his shot now, and this seems to be helping. No other symptoms of nausea throughout the week  Medication History:  Victoza stopped being covered by insurance and after last A1c    Current diabetes symptoms: notes a few mornings he has woken up feeling \"crummy\", lightheaded  Blood sugar monitoring: (patient reported): 120-130's, no lows " reported  Diet: Portion sizes are down  Eating more salads  Not eating out much anymore  Breakfast: cereal or [place] toast (without syrup)  Lunch: [place] ([place], cheese, and bread)  Dinner: Canned soup or chili  Planning to schedule annual eye appointment [facility]          Heart Failure   HFpEF (>/=50%)   Lisinopril 5mg daily  bumetanide 2mg 2 tablets twice daily  Potassium chloride 20meq twice daily - requesting refill today  NTG 0.4mg PRN chest pain   Patient reports no current medication side effects.       Compression stockings/wraps, now following with lymphedema clinic for leg wraps  Follows with [facility], no upcoming cardiology visit scheduled.   Patient is complaining of HF symptoms: BLAS which he reports is his baseline and is not new symptom  Patient is not measuring daily weights - hard time standing on the scale at home by himself; notes thighs are still puffy, but feet and ankles are back to normal, no issues getting shoes on at this time  Patient does not self-monitor blood pressure.   Patient is following a low sodium diet, is avoiding alcohol.         Today's Vitals: /70   Wt 302 lb (137 kg)   BMI 53.50 kg/m    ----------------      I spent 30 minutes with this patient today. All changes were made via collaborative practice agreement with Gordon Bryant MD. A copy of the visit note was provided to the patient's provider(s).    A summary of these recommendations was given to the patient.    Sarai Art, Lukasz.D., [facility]  Medication Therapy Management Pharmacist  [phone]     Medication Therapy Recommendations  No medication therapy recommendations to display

## 2024-09-04 NOTE — PATIENT INSTRUCTIONS
Recommendations from today's MTM visit:                                                      1. No changes to medications today. A1c looks GREAT!    2. Test your blood sugar in the morning before breakfast (goal of 80-130mg/dL), 2 hours after a meal (goal less than 180mg/dL), and anytime you feel it may be low (feeling shaky, dizzy, sweaty, weak). If your blood sugar is below 70mg/dL, have something to eat to bring it up. If your blood sugar is NOT below 70mg/dL, take a 5 minute break, have a glass water, and let the feeling pass. This is your body adjusting to lower and more normal blood sugar levels.    3. Schedule a follow-up with Dr. Bryant before the end of the year.      Follow-up: Return in about 6 months (around 3/4/2025) for Medication Therapy Management.    It was great to speak with you today.  I value your experience and would be very thankful for your time with providing feedback on our clinic survey. You may receive a survey via email or text message in the next few days.     To schedule another MTM appointment, please call the clinic directly or you may call the scheduling line at [phone].    My Clinical Pharmacist's contact information:                                                      Please feel free to contact me with any questions or concerns you have.      Sarai Art, Pharm.D., [facility]  Medication Therapy Management Pharmacist  [phone]

## 2024-10-25 ENCOUNTER — LAB REQUISITION (OUTPATIENT)
Dept: LAB | Facility: CLINIC | Age: 64
End: 2024-10-25
Payer: COMMERCIAL

## 2024-10-25 DIAGNOSIS — R26.89 OTHER ABNORMALITIES OF GAIT AND MOBILITY: ICD-10-CM

## 2024-10-25 LAB
BASOPHILS # BLD AUTO: 0.1 10E3/UL (ref 0–0.2)
BASOPHILS NFR BLD AUTO: 1 %
EOSINOPHIL # BLD AUTO: 0.4 10E3/UL (ref 0–0.7)
EOSINOPHIL NFR BLD AUTO: 5 %
ERYTHROCYTE [DISTWIDTH] IN BLOOD BY AUTOMATED COUNT: 14.3 % (ref 10–15)
HCT VFR BLD AUTO: 41.4 % (ref 40–53)
HGB BLD-MCNC: 12.6 G/DL (ref 13.3–17.7)
IMM GRANULOCYTES # BLD: 0.1 10E3/UL
IMM GRANULOCYTES NFR BLD: 1 %
LYMPHOCYTES # BLD AUTO: 1.7 10E3/UL (ref 0.8–5.3)
LYMPHOCYTES NFR BLD AUTO: 18 %
MCH RBC QN AUTO: 27 PG (ref 26.5–33)
MCHC RBC AUTO-ENTMCNC: 30.4 G/DL (ref 31.5–36.5)
MCV RBC AUTO: 89 FL (ref 78–100)
MONOCYTES # BLD AUTO: 1.2 10E3/UL (ref 0–1.3)
MONOCYTES NFR BLD AUTO: 12 %
NEUTROPHILS # BLD AUTO: 6.1 10E3/UL (ref 1.6–8.3)
NEUTROPHILS NFR BLD AUTO: 64 %
NRBC # BLD AUTO: 0 10E3/UL
NRBC BLD AUTO-RTO: 0 /100
PLATELET # BLD AUTO: 315 10E3/UL (ref 150–450)
RBC # BLD AUTO: 4.67 10E6/UL (ref 4.4–5.9)
WBC # BLD AUTO: 9.6 10E3/UL (ref 4–11)

## 2024-10-25 PROCEDURE — 85025 COMPLETE CBC W/AUTO DIFF WBC: CPT | Mod: ORL | Performed by: FAMILY MEDICINE

## 2024-10-25 PROCEDURE — 80053 COMPREHEN METABOLIC PANEL: CPT | Mod: ORL | Performed by: FAMILY MEDICINE

## 2024-10-25 PROCEDURE — 84443 ASSAY THYROID STIM HORMONE: CPT | Mod: ORL | Performed by: FAMILY MEDICINE

## 2024-10-26 LAB
ALBUMIN SERPL BCG-MCNC: 3.7 G/DL (ref 3.5–5.2)
ALP SERPL-CCNC: 90 U/L (ref 40–150)
ALT SERPL W P-5'-P-CCNC: 17 U/L (ref 0–70)
ANION GAP SERPL CALCULATED.3IONS-SCNC: 11 MMOL/L (ref 7–15)
AST SERPL W P-5'-P-CCNC: 15 U/L (ref 0–45)
BILIRUB SERPL-MCNC: 0.4 MG/DL
BUN SERPL-MCNC: 23.9 MG/DL (ref 8–23)
CALCIUM SERPL-MCNC: 9.1 MG/DL (ref 8.8–10.4)
CHLORIDE SERPL-SCNC: 100 MMOL/L (ref 98–107)
CREAT SERPL-MCNC: 1.24 MG/DL (ref 0.67–1.17)
EGFRCR SERPLBLD CKD-EPI 2021: 65 ML/MIN/1.73M2
GLUCOSE SERPL-MCNC: 144 MG/DL (ref 70–99)
HCO3 SERPL-SCNC: 24 MMOL/L (ref 22–29)
POTASSIUM SERPL-SCNC: 3.4 MMOL/L (ref 3.4–5.3)
PROT SERPL-MCNC: 7.6 G/DL (ref 6.4–8.3)
SODIUM SERPL-SCNC: 135 MMOL/L (ref 135–145)
TSH SERPL DL<=0.005 MIU/L-ACNC: 5.97 UIU/ML (ref 0.3–4.2)

## 2025-03-04 ENCOUNTER — LAB REQUISITION (OUTPATIENT)
Dept: LAB | Facility: CLINIC | Age: 65
End: 2025-03-04
Payer: COMMERCIAL

## 2025-03-04 ENCOUNTER — OFFICE VISIT (OUTPATIENT)
Dept: PHARMACY | Facility: PHYSICIAN GROUP | Age: 65
End: 2025-03-04
Payer: COMMERCIAL

## 2025-03-04 VITALS
WEIGHT: 300 LBS | SYSTOLIC BLOOD PRESSURE: 110 MMHG | DIASTOLIC BLOOD PRESSURE: 78 MMHG | BODY MASS INDEX: 53.16 KG/M2 | HEART RATE: 69 BPM | HEIGHT: 63 IN

## 2025-03-04 DIAGNOSIS — E78.5 HYPERLIPIDEMIA, UNSPECIFIED HYPERLIPIDEMIA TYPE: ICD-10-CM

## 2025-03-04 DIAGNOSIS — E11.9 TYPE 2 DIABETES MELLITUS WITHOUT COMPLICATION, WITHOUT LONG-TERM CURRENT USE OF INSULIN (H): Primary | ICD-10-CM

## 2025-03-04 DIAGNOSIS — E03.9 HYPOTHYROIDISM, UNSPECIFIED: ICD-10-CM

## 2025-03-04 DIAGNOSIS — I50.30 HEART FAILURE WITH PRESERVED EJECTION FRACTION, NYHA CLASS I (H): ICD-10-CM

## 2025-03-04 DIAGNOSIS — E03.9 HYPOTHYROIDISM, UNSPECIFIED TYPE: ICD-10-CM

## 2025-03-04 PROCEDURE — 84439 ASSAY OF FREE THYROXINE: CPT | Mod: ORL | Performed by: FAMILY MEDICINE

## 2025-03-04 PROCEDURE — 99207 PR NO CHARGE LOS: CPT | Performed by: PHARMACIST

## 2025-03-04 PROCEDURE — 84443 ASSAY THYROID STIM HORMONE: CPT | Mod: ORL | Performed by: FAMILY MEDICINE

## 2025-03-04 NOTE — PATIENT INSTRUCTIONS
Recommendations from today's MTM visit:                                                      ASSESSMENT:                            Medication Adherence/Access: No issues identified    Diabetes  Patient is meeting A1c goal of < 7%.  Self monitoring of blood glucose is at goal of fasting  mg/dL    Heart Failure   HFpEF (>/=50%)   Stable    Hypothyroidism   He has not been consistently taking levothyroxine 100mcg since dose was adjusted in June. Labs pending time of visit will indicate whether his dose is appropriate. However, need to clarify dosing with pharmacy.     Hyperlipidemia   Stable.         PLAN:                            1. I sent a cancellation request to the pharmacy for the levothyroxine 88mcg tablets. I have sent refills for the levothyroxine 100mcg one tablet by mouth daily. We will be in touch with you regarding your thyroid labs done today.    Follow-up: Tuesday, September 9th at 2:30 p.m.    It was great to speak with you today.  I value your experience and would be very thankful for your time with providing feedback on our clinic survey. You may receive a survey via email or text message in the next few days.     To schedule another MTM appointment, please call the clinic directly or you may call the scheduling line at [phone].    My Clinical Pharmacist's contact information:                                                      Please contact the clinic with any questions or concerns you have.      Sarai Art, Pharm.D., [facility]  Medication Therapy Management Pharmacist

## 2025-03-04 NOTE — PROGRESS NOTES
Medication Therapy Management (MTM) Encounter    ASSESSMENT:                            Medication Adherence/Access: No issues identified    Diabetes  Patient is meeting A1c goal of < 7%.  Self monitoring of blood glucose is at goal of fasting  mg/dL    Heart Failure   HFpEF (>/=50%)   Stable    Hypothyroidism   He has not been consistently taking levothyroxine 100mcg since dose was adjusted in June. Labs pending time of visit will indicate whether his dose is appropriate. However, need to clarify dosing with pharmacy.     Hyperlipidemia   Stable.         PLAN:                            1. I sent a cancellation request to the pharmacy for the levothyroxine 88mcg tablets. I have sent refills for the levothyroxine 100mcg one tablet by mouth daily. We will be in touch with you regarding your thyroid labs done today.    Follow-up: Tuesday, September 9th at 2:30 p.m.      SUBJECTIVE/OBJECTIVE:                          Shaan Harkins is a 65 year old male seen for a follow-up visit.     Reason for visit: Med Review    Allergies/ADRs: None  Past Medical History: Reviewed in chart  Tobacco: He reports that he has never smoked. He has never used smokeless tobacco.  Alcohol: none    Medication Adherence/Access: no issues reported  Ozempic through [facility]    Type 2 Diabetes:    Ozempic 0.5mg injected once weekly on Fridays - stayed on this dose due to vomiting about 30 minutes after taking his shot each week while on 1mg pen, and this is now resolved completely. He reports having a little water before his shot now, and this seems to be helping. No other symptoms of nausea throughout the week  Medication History:  Victoza stopped being covered by insurance and after last A1c    Current diabetes symptoms: none reported  Blood sugar monitoring: (patient reported): 120-130's, no lows reported  Diet: Portion sizes are down  Exercise: Has been walking 9 blocks daily in good weather    A1C (03/04/25) = 6.0%      Heart  "Failure   HFpEF (>/=50%)   Lisinopril 5mg daily  bumetanide 2mg 2 tablets twice daily  Potassium chloride 20meq twice daily  NTG 0.4mg PRN chest pain   Patient reports no current medication side effects.       Compression stockings/wraps, now following with lymphedema clinic for leg wraps  Follows with [facility].   Patient is complaining of HF symptoms: none  Patient is not measuring daily weights - hard time standing on the scale at home by himself; weight has been steadily decreasing per clinic scale  Patient does not self-monitor blood pressure.   Patient is following a low sodium diet, is avoiding alcohol.     Hypothyroidism   Levothyroxine 100 mcg daily  He also has Levothyroxine 88mcg on med list, both are marked as 'taking'.  Per chart review, his Levothyroxine was changed from 88mcg to 100mcg 6/12/24. Per e-rx log, the 100mcg tablets were sent to pharmacy on 7/10/24 for 90 tablets. Refill request from pharmacy was approved and sent for 88mcg tablets on 8/19 and 10/10.  Patient is having the following symptoms: none.   Thyroid level rechecked today     Hyperlipidemia   Rosuvastatin 10mg daily  Patient reports no significant myalgias or other side effects.  Recent Labs   Lab Test 06/10/24  1604 05/30/23  1604   CHOL 115 111   HDL 30* 27*   LDL 62 60   TRIG 116 120              Today's Vitals: /78   Pulse 69   Ht 5' 3\" (1.6 m)   Wt 300 lb (136.1 kg)   BMI 53.14 kg/m    ----------------      I spent 30 minutes with this patient today. All changes were made via collaborative practice agreement with Gordon Bryant MD. A copy of the visit note was provided to the patient's provider(s).    A summary of these recommendations was given to the patient.    Sarai Art, Pharm.D., [facility]  Medication Therapy Management Pharmacist  [phone]     Medication Therapy Recommendations  Hypothyroidism, unspecified type   1 Rationale: Order duration inappropriate - Dosage too low - " Effectiveness   Recommendation: Increase Dose - levothyroxine 100 MCG tablet   Status: Accepted per CPA   Identified Date: 3/4/2025 Completed Date: 3/4/2025

## 2025-03-05 LAB
T4 FREE SERPL-MCNC: 1.34 NG/DL (ref 0.9–1.7)
TSH SERPL DL<=0.005 MIU/L-ACNC: 4.85 UIU/ML (ref 0.3–4.2)

## 2025-06-05 ENCOUNTER — TRANSFERRED RECORDS (OUTPATIENT)
Dept: HEALTH INFORMATION MANAGEMENT | Facility: CLINIC | Age: 65
End: 2025-06-05

## 2025-06-10 ENCOUNTER — LAB REQUISITION (OUTPATIENT)
Dept: LAB | Facility: CLINIC | Age: 65
End: 2025-06-10
Payer: MEDICARE

## 2025-06-10 DIAGNOSIS — E78.5 HYPERLIPIDEMIA, UNSPECIFIED: ICD-10-CM

## 2025-06-10 DIAGNOSIS — E11.21 TYPE 2 DIABETES MELLITUS WITH DIABETIC NEPHROPATHY (H): ICD-10-CM

## 2025-06-10 DIAGNOSIS — E03.9 HYPOTHYROIDISM, UNSPECIFIED: ICD-10-CM

## 2025-06-10 DIAGNOSIS — Z12.5 ENCOUNTER FOR SCREENING FOR MALIGNANT NEOPLASM OF PROSTATE: ICD-10-CM

## 2025-06-11 LAB
ALBUMIN SERPL BCG-MCNC: 4 G/DL (ref 3.5–5.2)
ALP SERPL-CCNC: 102 U/L (ref 40–150)
ALT SERPL W P-5'-P-CCNC: 25 U/L (ref 0–70)
ANION GAP SERPL CALCULATED.3IONS-SCNC: 13 MMOL/L (ref 7–15)
AST SERPL W P-5'-P-CCNC: 22 U/L (ref 0–45)
BILIRUB SERPL-MCNC: 0.5 MG/DL
BUN SERPL-MCNC: 22.3 MG/DL (ref 8–23)
CALCIUM SERPL-MCNC: 9.5 MG/DL (ref 8.8–10.4)
CHLORIDE SERPL-SCNC: 102 MMOL/L (ref 98–107)
CHOLEST SERPL-MCNC: 111 MG/DL
CREAT SERPL-MCNC: 1.33 MG/DL (ref 0.67–1.17)
CREAT UR-MCNC: 172 MG/DL
EGFRCR SERPLBLD CKD-EPI 2021: 59 ML/MIN/1.73M2
FASTING STATUS PATIENT QL REPORTED: ABNORMAL
FASTING STATUS PATIENT QL REPORTED: ABNORMAL
GLUCOSE SERPL-MCNC: 101 MG/DL (ref 70–99)
HCO3 SERPL-SCNC: 24 MMOL/L (ref 22–29)
HDLC SERPL-MCNC: 31 MG/DL
LDLC SERPL CALC-MCNC: 61 MG/DL
MICROALBUMIN UR-MCNC: <12 MG/L
MICROALBUMIN/CREAT UR: NORMAL MG/G{CREAT}
NONHDLC SERPL-MCNC: 80 MG/DL
POTASSIUM SERPL-SCNC: 4.3 MMOL/L (ref 3.4–5.3)
PROT SERPL-MCNC: 8 G/DL (ref 6.4–8.3)
PSA SERPL DL<=0.01 NG/ML-MCNC: 0.41 NG/ML (ref 0–4.5)
SODIUM SERPL-SCNC: 139 MMOL/L (ref 135–145)
TRIGL SERPL-MCNC: 93 MG/DL
TSH SERPL DL<=0.005 MIU/L-ACNC: 2.92 UIU/ML (ref 0.3–4.2)

## (undated) DEVICE — SOL WATER IRRIG 1000ML BOTTLE 2F7114

## (undated) DEVICE — TUBING SUCTION MEDI-VAC 1/4"X20' N620A - HE

## (undated) DEVICE — SUCTION MANIFOLD NEPTUNE 2 SYS 1 PORT 702-025-000

## (undated) DEVICE — SNARE OVAL SMALL DISP 100600